# Patient Record
Sex: FEMALE | Race: WHITE | NOT HISPANIC OR LATINO | Employment: UNEMPLOYED | URBAN - NONMETROPOLITAN AREA
[De-identification: names, ages, dates, MRNs, and addresses within clinical notes are randomized per-mention and may not be internally consistent; named-entity substitution may affect disease eponyms.]

---

## 2017-07-03 ENCOUNTER — TRANSCRIBE ORDERS (OUTPATIENT)
Dept: ADMINISTRATIVE | Facility: HOSPITAL | Age: 57
End: 2017-07-03

## 2017-07-03 DIAGNOSIS — Z12.31 VISIT FOR SCREENING MAMMOGRAM: ICD-10-CM

## 2017-07-03 DIAGNOSIS — Z13.820 SCREENING FOR OSTEOPOROSIS: Primary | ICD-10-CM

## 2017-07-21 ENCOUNTER — HOSPITAL ENCOUNTER (OUTPATIENT)
Dept: MAMMOGRAPHY | Facility: HOSPITAL | Age: 57
Discharge: HOME | End: 2017-07-21
Admitting: ADVANCED PRACTICE MIDWIFE

## 2017-07-21 ENCOUNTER — HOSPITAL ENCOUNTER (OUTPATIENT)
Dept: BONE DENSITY | Facility: HOSPITAL | Age: 57
Discharge: HOME | End: 2017-07-21

## 2017-07-21 DIAGNOSIS — Z12.31 VISIT FOR SCREENING MAMMOGRAM: ICD-10-CM

## 2017-07-21 DIAGNOSIS — Z13.820 SCREENING FOR OSTEOPOROSIS: ICD-10-CM

## 2017-07-21 PROCEDURE — G0202 SCR MAMMO BI INCL CAD: HCPCS

## 2017-07-21 PROCEDURE — 77063 BREAST TOMOSYNTHESIS BI: CPT | Performed by: RADIOLOGY

## 2017-07-21 PROCEDURE — 77063 BREAST TOMOSYNTHESIS BI: CPT

## 2017-07-21 PROCEDURE — 77080 DXA BONE DENSITY AXIAL: CPT | Performed by: RADIOLOGY

## 2017-07-21 PROCEDURE — 77067 SCR MAMMO BI INCL CAD: CPT | Performed by: RADIOLOGY

## 2017-07-21 PROCEDURE — 77080 DXA BONE DENSITY AXIAL: CPT

## 2020-03-23 ENCOUNTER — HOSPITAL ENCOUNTER (EMERGENCY)
Facility: HOSPITAL | Age: 60
Discharge: STILL ADMITTED OR ADMINISTRATIVE | End: 2020-03-23
Attending: EMERGENCY MEDICINE

## 2020-03-23 ENCOUNTER — HOSPITAL ENCOUNTER (INPATIENT)
Facility: HOSPITAL | Age: 60
LOS: 4 days | Discharge: HOME | End: 2020-03-27
Attending: PSYCHIATRY & NEUROLOGY | Admitting: PSYCHIATRY & NEUROLOGY

## 2020-03-23 ENCOUNTER — APPOINTMENT (OUTPATIENT)
Dept: GENERAL RADIOLOGY | Facility: HOSPITAL | Age: 60
End: 2020-03-23

## 2020-03-23 VITALS
RESPIRATION RATE: 18 BRPM | WEIGHT: 175 LBS | TEMPERATURE: 97.8 F | HEART RATE: 94 BPM | DIASTOLIC BLOOD PRESSURE: 83 MMHG | SYSTOLIC BLOOD PRESSURE: 129 MMHG | HEIGHT: 66 IN | OXYGEN SATURATION: 99 % | BODY MASS INDEX: 28.12 KG/M2

## 2020-03-23 DIAGNOSIS — R45.851 SUICIDAL IDEATION: Primary | ICD-10-CM

## 2020-03-23 DIAGNOSIS — N39.0 ACUTE UTI: ICD-10-CM

## 2020-03-23 PROBLEM — F32.9 MDD (MAJOR DEPRESSIVE DISORDER): Status: ACTIVE | Noted: 2020-03-23

## 2020-03-23 LAB
6-ACETYL MORPHINE: NEGATIVE
ALBUMIN SERPL-MCNC: 4.21 G/DL (ref 3.5–5.2)
ALBUMIN/GLOB SERPL: 1.3 G/DL
ALP SERPL-CCNC: 110 U/L (ref 39–117)
ALT SERPL W P-5'-P-CCNC: 157 U/L (ref 1–33)
AMPHET+METHAMPHET UR QL: NEGATIVE
ANION GAP SERPL CALCULATED.3IONS-SCNC: 15.4 MMOL/L (ref 5–15)
AST SERPL-CCNC: 85 U/L (ref 1–32)
B PERT DNA SPEC QL NAA+PROBE: NOT DETECTED
BACTERIA UR QL AUTO: ABNORMAL /HPF
BARBITURATES UR QL SCN: NEGATIVE
BASOPHILS # BLD AUTO: 0.05 10*3/MM3 (ref 0–0.2)
BASOPHILS NFR BLD AUTO: 0.7 % (ref 0–1.5)
BENZODIAZ UR QL SCN: NEGATIVE
BILIRUB SERPL-MCNC: 0.4 MG/DL (ref 0.2–1.2)
BILIRUB UR QL STRIP: NEGATIVE
BUN BLD-MCNC: 5 MG/DL (ref 6–20)
BUN/CREAT SERPL: 7 (ref 7–25)
BUPRENORPHINE SERPL-MCNC: NEGATIVE NG/ML
C PNEUM DNA NPH QL NAA+NON-PROBE: NOT DETECTED
CALCIUM SPEC-SCNC: 9.1 MG/DL (ref 8.6–10.5)
CANNABINOIDS SERPL QL: NEGATIVE
CHLORIDE SERPL-SCNC: 102 MMOL/L (ref 98–107)
CLARITY UR: CLEAR
CO2 SERPL-SCNC: 23.6 MMOL/L (ref 22–29)
COCAINE UR QL: NEGATIVE
COLOR UR: YELLOW
CREAT BLD-MCNC: 0.71 MG/DL (ref 0.57–1)
CRP SERPL-MCNC: 0.51 MG/DL (ref 0–0.5)
DEPRECATED RDW RBC AUTO: 47.2 FL (ref 37–54)
EOSINOPHIL # BLD AUTO: 0.19 10*3/MM3 (ref 0–0.4)
EOSINOPHIL NFR BLD AUTO: 2.5 % (ref 0.3–6.2)
ERYTHROCYTE [DISTWIDTH] IN BLOOD BY AUTOMATED COUNT: 13.9 % (ref 12.3–15.4)
ETHANOL BLD-MCNC: <10 MG/DL (ref 0–10)
ETHANOL UR QL: <0.01 %
FLUAV H1 2009 PAND RNA NPH QL NAA+PROBE: NOT DETECTED
FLUAV H1 HA GENE NPH QL NAA+PROBE: NOT DETECTED
FLUAV H3 RNA NPH QL NAA+PROBE: NOT DETECTED
FLUAV SUBTYP SPEC NAA+PROBE: NOT DETECTED
FLUBV RNA ISLT QL NAA+PROBE: NOT DETECTED
GFR SERPL CREATININE-BSD FRML MDRD: 85 ML/MIN/1.73
GLOBULIN UR ELPH-MCNC: 3.2 GM/DL
GLUCOSE BLD-MCNC: 110 MG/DL (ref 65–99)
GLUCOSE UR STRIP-MCNC: NEGATIVE MG/DL
HADV DNA SPEC NAA+PROBE: NOT DETECTED
HCOV 229E RNA SPEC QL NAA+PROBE: NOT DETECTED
HCOV HKU1 RNA SPEC QL NAA+PROBE: NOT DETECTED
HCOV NL63 RNA SPEC QL NAA+PROBE: NOT DETECTED
HCOV OC43 RNA SPEC QL NAA+PROBE: NOT DETECTED
HCT VFR BLD AUTO: 43.3 % (ref 34–46.6)
HGB BLD-MCNC: 13.7 G/DL (ref 12–15.9)
HGB UR QL STRIP.AUTO: ABNORMAL
HMPV RNA NPH QL NAA+NON-PROBE: NOT DETECTED
HPIV1 RNA SPEC QL NAA+PROBE: NOT DETECTED
HPIV2 RNA SPEC QL NAA+PROBE: NOT DETECTED
HPIV3 RNA NPH QL NAA+PROBE: NOT DETECTED
HPIV4 P GENE NPH QL NAA+PROBE: NOT DETECTED
HYALINE CASTS UR QL AUTO: ABNORMAL /LPF
IMM GRANULOCYTES # BLD AUTO: 0.05 10*3/MM3 (ref 0–0.05)
IMM GRANULOCYTES NFR BLD AUTO: 0.7 % (ref 0–0.5)
KETONES UR QL STRIP: NEGATIVE
LDH SERPL-CCNC: 273 U/L (ref 135–214)
LEUKOCYTE ESTERASE UR QL STRIP.AUTO: ABNORMAL
LYMPHOCYTES # BLD AUTO: 1.96 10*3/MM3 (ref 0.7–3.1)
LYMPHOCYTES NFR BLD AUTO: 25.7 % (ref 19.6–45.3)
M PNEUMO IGG SER IA-ACNC: NOT DETECTED
MCH RBC QN AUTO: 29.7 PG (ref 26.6–33)
MCHC RBC AUTO-ENTMCNC: 31.6 G/DL (ref 31.5–35.7)
MCV RBC AUTO: 93.9 FL (ref 79–97)
METHADONE UR QL SCN: NEGATIVE
MONOCYTES # BLD AUTO: 0.75 10*3/MM3 (ref 0.1–0.9)
MONOCYTES NFR BLD AUTO: 9.8 % (ref 5–12)
NEUTROPHILS # BLD AUTO: 4.64 10*3/MM3 (ref 1.7–7)
NEUTROPHILS NFR BLD AUTO: 60.6 % (ref 42.7–76)
NITRITE UR QL STRIP: NEGATIVE
NRBC BLD AUTO-RTO: 0 /100 WBC (ref 0–0.2)
OPIATES UR QL: NEGATIVE
OXYCODONE UR QL SCN: NEGATIVE
PCP UR QL SCN: NEGATIVE
PH UR STRIP.AUTO: <=5 [PH] (ref 5–8)
PLATELET # BLD AUTO: 258 10*3/MM3 (ref 140–450)
PMV BLD AUTO: 9.7 FL (ref 6–12)
POTASSIUM BLD-SCNC: 4.1 MMOL/L (ref 3.5–5.2)
PROT SERPL-MCNC: 7.4 G/DL (ref 6–8.5)
PROT UR QL STRIP: NEGATIVE
RBC # BLD AUTO: 4.61 10*6/MM3 (ref 3.77–5.28)
RBC # UR: ABNORMAL /HPF
REF LAB TEST METHOD: ABNORMAL
RHINOVIRUS RNA SPEC NAA+PROBE: DETECTED
RSV RNA NPH QL NAA+NON-PROBE: NOT DETECTED
S PYO AG THROAT QL: NEGATIVE
SODIUM BLD-SCNC: 141 MMOL/L (ref 136–145)
SP GR UR STRIP: <=1.005 (ref 1–1.03)
SQUAMOUS #/AREA URNS HPF: ABNORMAL /HPF
UROBILINOGEN UR QL STRIP: ABNORMAL
WBC NRBC COR # BLD: 7.64 10*3/MM3 (ref 3.4–10.8)
WBC UR QL AUTO: ABNORMAL /HPF

## 2020-03-23 PROCEDURE — 86140 C-REACTIVE PROTEIN: CPT | Performed by: NURSE PRACTITIONER

## 2020-03-23 PROCEDURE — 80307 DRUG TEST PRSMV CHEM ANLYZR: CPT | Performed by: NURSE PRACTITIONER

## 2020-03-23 PROCEDURE — 81001 URINALYSIS AUTO W/SCOPE: CPT | Performed by: NURSE PRACTITIONER

## 2020-03-23 PROCEDURE — 93005 ELECTROCARDIOGRAM TRACING: CPT | Performed by: PSYCHIATRY & NEUROLOGY

## 2020-03-23 PROCEDURE — 87081 CULTURE SCREEN ONLY: CPT | Performed by: NURSE PRACTITIONER

## 2020-03-23 PROCEDURE — 80053 COMPREHEN METABOLIC PANEL: CPT | Performed by: NURSE PRACTITIONER

## 2020-03-23 PROCEDURE — 85025 COMPLETE CBC W/AUTO DIFF WBC: CPT | Performed by: NURSE PRACTITIONER

## 2020-03-23 PROCEDURE — 87880 STREP A ASSAY W/OPTIC: CPT | Performed by: NURSE PRACTITIONER

## 2020-03-23 PROCEDURE — 71045 X-RAY EXAM CHEST 1 VIEW: CPT | Performed by: RADIOLOGY

## 2020-03-23 PROCEDURE — 71045 X-RAY EXAM CHEST 1 VIEW: CPT

## 2020-03-23 PROCEDURE — 83615 LACTATE (LD) (LDH) ENZYME: CPT | Performed by: NURSE PRACTITIONER

## 2020-03-23 PROCEDURE — 0099U HC BIOFIRE FILMARRAY RESP PANEL 1: CPT | Performed by: NURSE PRACTITIONER

## 2020-03-23 PROCEDURE — 87040 BLOOD CULTURE FOR BACTERIA: CPT | Performed by: NURSE PRACTITIONER

## 2020-03-23 RX ORDER — AMOXICILLIN 500 MG/1
1000 CAPSULE ORAL 2 TIMES DAILY
COMMUNITY
Start: 2020-03-11 | End: 2020-03-27 | Stop reason: HOSPADM

## 2020-03-23 RX ORDER — BENZTROPINE MESYLATE 1 MG/ML
1 INJECTION INTRAMUSCULAR; INTRAVENOUS ONCE AS NEEDED
Status: DISCONTINUED | OUTPATIENT
Start: 2020-03-23 | End: 2020-03-27 | Stop reason: HOSPADM

## 2020-03-23 RX ORDER — CEFDINIR 300 MG/1
300 CAPSULE ORAL EVERY 12 HOURS SCHEDULED
Status: DISCONTINUED | OUTPATIENT
Start: 2020-03-24 | End: 2020-03-23

## 2020-03-23 RX ORDER — BENZONATATE 100 MG/1
100 CAPSULE ORAL 3 TIMES DAILY PRN
Status: DISCONTINUED | OUTPATIENT
Start: 2020-03-23 | End: 2020-03-27 | Stop reason: HOSPADM

## 2020-03-23 RX ORDER — ALUMINA, MAGNESIA, AND SIMETHICONE 2400; 2400; 240 MG/30ML; MG/30ML; MG/30ML
15 SUSPENSION ORAL EVERY 6 HOURS PRN
Status: DISCONTINUED | OUTPATIENT
Start: 2020-03-23 | End: 2020-03-27 | Stop reason: HOSPADM

## 2020-03-23 RX ORDER — ONDANSETRON 4 MG/1
4 TABLET, FILM COATED ORAL EVERY 6 HOURS PRN
Status: DISCONTINUED | OUTPATIENT
Start: 2020-03-23 | End: 2020-03-27 | Stop reason: HOSPADM

## 2020-03-23 RX ORDER — HYDROXYZINE 50 MG/1
50 TABLET, FILM COATED ORAL EVERY 6 HOURS PRN
Status: DISCONTINUED | OUTPATIENT
Start: 2020-03-23 | End: 2020-03-25

## 2020-03-23 RX ORDER — BENZTROPINE MESYLATE 1 MG/1
2 TABLET ORAL ONCE AS NEEDED
Status: DISCONTINUED | OUTPATIENT
Start: 2020-03-23 | End: 2020-03-27 | Stop reason: HOSPADM

## 2020-03-23 RX ORDER — CEFDINIR 300 MG/1
300 CAPSULE ORAL ONCE
Status: COMPLETED | OUTPATIENT
Start: 2020-03-23 | End: 2020-03-23

## 2020-03-23 RX ORDER — CLONIDINE HYDROCHLORIDE 0.1 MG/1
0.1 TABLET ORAL ONCE
Status: COMPLETED | OUTPATIENT
Start: 2020-03-23 | End: 2020-03-23

## 2020-03-23 RX ORDER — FAMOTIDINE 20 MG/1
20 TABLET, FILM COATED ORAL 2 TIMES DAILY PRN
Status: DISCONTINUED | OUTPATIENT
Start: 2020-03-23 | End: 2020-03-27 | Stop reason: HOSPADM

## 2020-03-23 RX ORDER — CEFDINIR 300 MG/1
300 CAPSULE ORAL EVERY 12 HOURS SCHEDULED
Status: DISCONTINUED | OUTPATIENT
Start: 2020-03-24 | End: 2020-03-27 | Stop reason: HOSPADM

## 2020-03-23 RX ORDER — AMOXICILLIN 500 MG/1
1000 CAPSULE ORAL 2 TIMES DAILY
Status: CANCELLED | OUTPATIENT
Start: 2020-03-24 | End: 2020-03-24

## 2020-03-23 RX ORDER — LOPERAMIDE HYDROCHLORIDE 2 MG/1
2 CAPSULE ORAL
Status: DISCONTINUED | OUTPATIENT
Start: 2020-03-23 | End: 2020-03-27 | Stop reason: HOSPADM

## 2020-03-23 RX ORDER — PANTOPRAZOLE SODIUM 40 MG/1
40 TABLET, DELAYED RELEASE ORAL DAILY
COMMUNITY

## 2020-03-23 RX ORDER — IBUPROFEN 400 MG/1
400 TABLET ORAL EVERY 6 HOURS PRN
Status: DISCONTINUED | OUTPATIENT
Start: 2020-03-23 | End: 2020-03-27 | Stop reason: HOSPADM

## 2020-03-23 RX ORDER — NITROFURANTOIN 25; 75 MG/1; MG/1
100 CAPSULE ORAL ONCE
Status: DISCONTINUED | OUTPATIENT
Start: 2020-03-23 | End: 2020-03-23

## 2020-03-23 RX ORDER — ECHINACEA PURPUREA EXTRACT 125 MG
2 TABLET ORAL AS NEEDED
Status: DISCONTINUED | OUTPATIENT
Start: 2020-03-23 | End: 2020-03-27 | Stop reason: HOSPADM

## 2020-03-23 RX ORDER — TRAZODONE HYDROCHLORIDE 50 MG/1
50 TABLET ORAL NIGHTLY PRN
Status: DISCONTINUED | OUTPATIENT
Start: 2020-03-23 | End: 2020-03-27 | Stop reason: HOSPADM

## 2020-03-23 RX ADMIN — CEFDINIR 300 MG: 300 CAPSULE ORAL at 20:30

## 2020-03-23 RX ADMIN — CLONIDINE HYDROCHLORIDE 0.1 MG: 0.1 TABLET ORAL at 20:30

## 2020-03-23 NOTE — ED PROVIDER NOTES
Subjective     History provided by:  Patient   used: No    URI   Presenting symptoms: congestion, cough and fever    Severity:  Moderate  Onset quality:  Sudden  Timing:  Constant  Progression:  Waxing and waning  Chronicity:  Recurrent  Relieved by:  Nothing  Worsened by:  Nothing  Ineffective treatments:  Prescription medications  Associated symptoms: wheezing    Associated symptoms: no arthralgias    Risk factors: not elderly    Risk factors comment:  Fredy is homeless and lives at shelter. Reports she was kicked out of shelter      Review of Systems   Constitutional: Positive for fever.   HENT: Positive for congestion.    Eyes: Negative.    Respiratory: Positive for cough and wheezing.    Cardiovascular: Negative.    Gastrointestinal: Negative.    Endocrine: Negative.    Genitourinary: Negative.    Musculoskeletal: Negative.  Negative for arthralgias.   Skin: Negative.    Allergic/Immunologic: Negative.    Neurological: Negative.    Hematological: Negative.    Psychiatric/Behavioral: Positive for dysphoric mood and suicidal ideas.       Past Medical History:   Diagnosis Date   • Anxiety    • Depression        No Known Allergies    Past Surgical History:   Procedure Laterality Date   • TONSILLECTOMY         Family History   Problem Relation Age of Onset   • Depression Sister        Social History     Socioeconomic History   • Marital status: Single     Spouse name: Not on file   • Number of children: Not on file   • Years of education: Not on file   • Highest education level: Not on file   Tobacco Use   • Smoking status: Former Smoker   • Smokeless tobacco: Never Used   Substance and Sexual Activity   • Alcohol use: Not Currently   • Drug use: Not Currently     Types: Benzodiazepines, Marijuana   • Sexual activity: Not Currently     Partners: Male           Objective   Physical Exam   Constitutional: She is oriented to person, place, and time. She appears well-developed and well-nourished.    HENT:   Head: Normocephalic.   Right Ear: External ear normal.   Left Ear: External ear normal.   Mouth/Throat: Oropharynx is clear and moist.   Eyes: Pupils are equal, round, and reactive to light. Conjunctivae and EOM are normal.   Neck: Normal range of motion. Neck supple.   Cardiovascular: Normal rate, regular rhythm, normal heart sounds and intact distal pulses.   Pulmonary/Chest: Effort normal and breath sounds normal.   Abdominal: Soft. Bowel sounds are normal.   Musculoskeletal: Normal range of motion.   Neurological: She is alert and oriented to person, place, and time.   Skin: Skin is warm and dry. Capillary refill takes less than 2 seconds.   Psychiatric: Her behavior is normal. She exhibits a depressed mood. She expresses suicidal ideation. She expresses suicidal plans.   Nursing note and vitals reviewed.      Procedures           ED Course  ED Course as of Mar 23 2248   Mon Mar 23, 2020   2019 Patient is medically clear and stable for psych eval. Patient has UTI and recommend Cefdinir 300 mg PO BID x 7 days for UTI    [TAD]   2145 IMPRESSION:  No radiographic evidence of acute cardiac or pulmonary  disease.   XR Chest AP [ES]      ED Course User Index  [ES] Javier Ugarte MD  [TAD] Matt Gale, APRN                                           [facility]    Final diagnoses:   Suicidal ideation   Acute UTI            Javier Ugarte MD  03/23/20 2247       Javier Ugarte MD  03/23/20 2248

## 2020-03-24 LAB
HAV IGM SERPL QL IA: NORMAL
HBV CORE IGM SERPL QL IA: NORMAL
HBV SURFACE AG SERPL QL IA: NORMAL
HCV AB SER DONR QL: NORMAL
HIV1+2 AB SER QL: NORMAL
HOLD SPECIMEN: NORMAL

## 2020-03-24 PROCEDURE — 93010 ELECTROCARDIOGRAM REPORT: CPT | Performed by: INTERNAL MEDICINE

## 2020-03-24 PROCEDURE — G0432 EIA HIV-1/HIV-2 SCREEN: HCPCS | Performed by: PSYCHIATRY & NEUROLOGY

## 2020-03-24 PROCEDURE — 80074 ACUTE HEPATITIS PANEL: CPT | Performed by: PSYCHIATRY & NEUROLOGY

## 2020-03-24 RX ORDER — PANTOPRAZOLE SODIUM 40 MG/1
40 TABLET, DELAYED RELEASE ORAL DAILY
Status: DISCONTINUED | OUTPATIENT
Start: 2020-03-24 | End: 2020-03-27 | Stop reason: HOSPADM

## 2020-03-24 RX ORDER — BUSPIRONE HYDROCHLORIDE 10 MG/1
5 TABLET ORAL EVERY 8 HOURS SCHEDULED
Status: DISCONTINUED | OUTPATIENT
Start: 2020-03-24 | End: 2020-03-27 | Stop reason: HOSPADM

## 2020-03-24 RX ADMIN — BUSPIRONE HYDROCHLORIDE 5 MG: 10 TABLET ORAL at 13:09

## 2020-03-24 RX ADMIN — CEFDINIR 300 MG: 300 CAPSULE ORAL at 21:12

## 2020-03-24 RX ADMIN — HYDROXYZINE HYDROCHLORIDE 50 MG: 50 TABLET ORAL at 15:45

## 2020-03-24 RX ADMIN — BUSPIRONE HYDROCHLORIDE 5 MG: 10 TABLET ORAL at 21:12

## 2020-03-24 RX ADMIN — SERTRALINE 50 MG: 50 TABLET, FILM COATED ORAL at 13:09

## 2020-03-24 RX ADMIN — HYDROXYZINE HYDROCHLORIDE 50 MG: 50 TABLET ORAL at 08:07

## 2020-03-24 RX ADMIN — CEFDINIR 300 MG: 300 CAPSULE ORAL at 08:07

## 2020-03-24 RX ADMIN — PANTOPRAZOLE SODIUM 40 MG: 40 TABLET, DELAYED RELEASE ORAL at 08:07

## 2020-03-24 NOTE — PLAN OF CARE
Problem: Patient Care Overview  Goal: Plan of Care Review  Outcome: Ongoing (interventions implemented as appropriate)  Flowsheets (Taken 3/24/2020 0233)  Progress: no change  Plan of Care Reviewed With: patient  Patient Agreement with Plan of Care: agrees  Outcome Summary: New admit; see previous nursing notes for admission details. Patient calm and cooperative since arrival to unit.

## 2020-03-24 NOTE — NURSING NOTE
Spoke to lab at this time, states the pt's respiratory panel results should be available in approx 10 minutes.

## 2020-03-24 NOTE — NURSING NOTE
Pt states she began having suicidal thoughts last week, states she is not in a good situation right now.  Pt states she was in [facility], states she left today, states she has been sick since January, states others there were diagnosed with RSV so she just left today.      Pt states has a plan of either overdosing on medication or using a gun to kill herself, pt states she does not have access to a gun and has not attempted suicide in the past.  Pt denies any HI or AVH, pt also denies any alcohol or substance abuse.    Pt rates depression 10/10 and anxiety 10/10 at this time.

## 2020-03-24 NOTE — PLAN OF CARE
Problem: Patient Care Overview  Goal: Plan of Care Review  Flowsheets (Taken 3/24/2020 0710 by Barbara Garnett RN)  Plan of Care Reviewed With: patient  Patient Agreement with Plan of Care: agrees     Problem: Patient Care Overview  Goal: Individualization and Mutuality  Flowsheets  Taken 3/24/2020 1423 by Greta Tinajero  Patient Personal Strengths: expressive of needs;expressive of emotions;motivated for treatment;motivated for recovery  Patient Vulnerabilities: Ineffective coping skills; depression; lack of resources; homeless  Taken 3/24/2020 1432 by Greta Tinajero  Patient Specific Goals (Include Timeframe): Patient will deny SI at discharge. Patient will identify 1-2 healthy coping skills prior to discharge.  Patient Specific Interventions: Patient will be given daily Psychiatric evaluation, 20 minutes each day; Patient will be offered 1-4 individual sessions 20-30 minutes each day and daily groups. Will utilize CBT and brief therapy modalities; Will assist with aftercare.  Taken 3/23/2020 4436 by Deidre George RN  What Information Would Help Us Give You More Personalized Care?: None verbalized     Problem: Patient Care Overview  Goal: Discharge Needs Assessment  Flowsheets  Taken 3/24/2020 1432 by Greta Tinajero  Outpatient/Agency/Support Group Needs: outpatient medication management;outpatient counseling  Discharge Coordination/Progress: Patient has [employer] insurance and should be able to afford medications at discharge.  Current Discharge Risk: psychiatric illness;homeless;financial support inadequate;lack of support system/caregiver  Concerns to be Addressed: suicidal;mental health;coping/stress;financial/insurance;homelessness  Readmission Within the Last 30 Days: no previous admission in last 30 days  Patient/Family Anticipated Services at Transition: mental health services  Patient's Choice of Community Agency(s): Unsure at this time as patient is not sure where she will be  staying  Patient/Family Anticipates Transition to: shelter  Taken 3/23/2020 2326 by Deidre George RN  Transportation Anticipated: car, drives self     Problem: Patient Care Overview  Goal: Interprofessional Rounds/Family Conf  Flowsheets (Taken 3/24/2020 1432)  Participants: family; nursing; psychiatrist; social work; milieu/psych techs  Summary: Therapist will communicate with Nursing; Psychiatry and Mental health Techs for ongoing collateral and treatment planning. Will encourage family involvement.     Problem: Overarching Goals (Adult)  Goal: Optimized Coping Skills in Response to Life Stressors  Intervention: Promote Effective Coping Strategies  Note:   Unable to identify any healthy coping skills at this time. Patient is on precautions.     Problem: Overarching Goals (Adult)  Goal: Develops/Participates in Therapeutic [place] to Support Successful Transition  Intervention: Foster Therapeutic [place]  Flowsheets (Taken 3/24/2020 1432)  Trust Relationship/Rapport: care explained     Problem: Overarching Goals (Adult)  Goal: Develops/Participates in Therapeutic [place] to Support Successful Transition  Intervention: Mutually Develop Transition Plan  Note:   Unable to discuss aftercare at this time due to patient being under special precautions.    DATA:         Therapist is unable to meet one on one with the patient due to her being on special precautions for fever and cough. Most of the information was taken from the medical record.     Clinical Maneuvering/Intervention:     Therapist completed integrated summary, treatment plan, and initiated social history this date.  Therapist is strongly encouraging family involvement in treatment.       ASSESSMENT:      Ms. Tigist Bates is a 59 year old  female who is single; no children; high school diploma and unemployed. Patient has been homeless for months after loosing her apartment for not paying rent. Patient presents to the ER with suicidal  thoughts with a plan to overdose or shoot herself. She states that she does not have access to a firearm. Patient reports that she has been feeling helpless; hopeless; worthless; tearful and fatigue. Patient reports also having flu like symptoms for 3 months. She has a fever and a cough. She is currently staying in her room due to precautionary measures.  Patient has not had any previous inpatient hospitalizations. She has took Elavil in the past. Patient is unsure about discharge plans at this point due to her illness and being homeless. Therapist will encourage family contact.     PLAN:       Patient to remain hospitalized this date.     Treatment team will focus efforts on stabilizing patient's acute symptoms while providing education on healthy coping and crisis management to reduce hospitalizations.   Patient requires daily psychiatrist evaluation and 24/7 nursing supervision to promote patient  safety.     Therapist will offer 1-4 individual sessions, 1 therapy group daily, family education, and appropriate referral.    Therapist recommends medication management and talk therapy. Will assist patient in finding housing upon discharge. This could be an issue due to patient's current medical condition.

## 2020-03-24 NOTE — NURSING NOTE
Dr. Cloud called back, new orders to admit the patient to A&E with routine orders SP3, private room, and keep a surgical mask on the patient at all times.

## 2020-03-24 NOTE — NURSING NOTE
Patient arrived to unit and vital signs were obtained. Temp 99.5, /85, Resp 18, HR 90, O2 sat 96%. On-call MD, VEENA Cloud, notified in regards to the temperature rapidly jumping from the recorded temperature of 97.8 in the intake area to 99.5 when patient arrived to unit. On-call MD recommended that no medication be given to patient for temperature at this time and to keep checking patient's temperature throughout the night. On-call MD also recommended that patient be educated on the importance of wearing a mask at all times even while in room.

## 2020-03-24 NOTE — H&P
"Patient Care Team:  Sher Blackburn DO as PCP - General (Family Medicine)    Chief Complaint: \"I have had flu symptoms since mid January, have been on antibiotics twice, was prescribed penicillin for 2 weeks, then amoxicillin for 2 weeks, took last dose yesterday.. I asked her if she has any nervous complaints, she says she is very nervous, she also has sadness, she cries.  She also said that she had suicidal thoughts last week, but denied that she had a plan.  She denied that she has suicidal thoughts today.    Subjective       History of Present Illness: Patient is a 59-year-old single female never , has no children, was living alone, lost her Apt. 3 weeks ago, has been in the homeless shelter since then, but does not intend to return back there.  She was admitted on 3/23/2020 after she presented to the hospital reporting congestion cough and fever, also reported that she was homeless and was kicked out of the shelter.  She was medically cleared and admitted to  psychiatry because she reported suicidal thoughts since last week and also reported plan to overdose or using a gun she did say she did not have access to the gun and denied any previous suicidal attempts     I did the examination on the phone patient sitting in the room next to me  she reported complaints as described above.. She did not confirm any stressors other than currently being homeless.  I have talked with Yane Cooper on phone, In presence of Minerva Salguero. She has told me that she has confirmed that patient does not need Covid-19 testing.   Substance Abuse History: She denied any    Legal History: She denied any    Past Psychiatric History: Patient denied any but did state that she has been on Elavil long time ago from her family physician, and could not confirm the dates or the dose  She denies any history of psychiatric hospitalizations and denied any previous suicide attempts    History Dr. Blacbkurn is her family physician.  She " last saw him 2 weeks ago for respiratory complaints and was prescribed amoxicillin for 2 weeks which she completed yesterday.  She has had tonsillectomy.  She denies any known drug allergies.  She is currently also on Protonix 40 mg daily  Past Medical History:   Diagnosis Date   • Anxiety    • Chronic pain    • Depression    • GERD (gastroesophageal reflux disease)      Past Surgical History:   Procedure Laterality Date   • TONSILLECTOMY       Family History   Problem Relation Age of Onset   • Depression Sister      Social History     Tobacco Use   • Smoking status: Former Smoker   • Smokeless tobacco: Never Used   Substance Use Topics   • Alcohol use: Not Currently   • Drug use: Not Currently     Types: Benzodiazepines, Marijuana     Medications Prior to Admission   Medication Sig Dispense Refill Last Dose   • amoxicillin (AMOXIL) 500 MG capsule Take 1,000 mg by mouth 2 (Two) Times a Day.   3/23/2020 at AM   • pantoprazole (PROTONIX) 40 MG EC tablet Take 40 mg by mouth Daily.   3/23/2020 at AM     Allergies:  Patient has no known allergies.  Family history patient says her sister sees a psychiatrist for depression  Personal history she has an associate degree in college.  She last worked in 2015 when she was working for [employer]  Review of Systems:     Constitution: No complaints today  Eyes: Negative  ENT: Negative  Respiratory: Negative  Cardiovascular: Negative  Gastrointestinal: Negative  Genitourinary negative:   Musculoskeletal: Negative  Neurological: Negative    Mental Status Exam:    Hygiene:   fair  Cooperation:  Cooperative  Eye Contact:  Fair  Psychomotor Behavior:  Appropriate  Affect:  Appropriate  Speech:  Normal  Thought Progress:  Goal directed  Thought Content:  Mood congruent  Suicidal:  None today  Homicidal:  None  Hallucinations:  None  Delusion:  None  Memory:  Intact  Orientation:  Person, Place and Time  Reliability:  fair  Insight:  Fair  Judgement:  Fair and  Impaired      Physical Exam:      General Appearance:    Alert, cooperative, in no acute distress   Head:    Normocephalic, without obvious abnormality, atraumatic   Eyes:            Lids and lashes normal, conjunctivae and sclerae normal, no   icterus, no pallor, corneas clear, PERRLA   Ears:    Ears appear intact with no abnormalities noted   Throat:   No oral lesions, no thrush, oral mucosa moist   Neck:   No adenopathy, supple, trachea midline, no thyromegaly, no     carotid bruit, no JVD   Back:     No kyphosis present, no scoliosis present, no skin lesions,       erythema or scars, no tenderness to percussion or                   palpation,   range of motion normal   Lungs:     Clear to auscultation,respirations regular, even and                   unlabored    Heart:    Regular rhythm and normal rate, normal S1 and S2, no            murmur, no gallop, no rub, no click   Breast Exam:    Deferred   Abdomen:     Normal bowel sounds, no masses, no organomegaly, soft        non-tender, non-distended, no guarding, no rebound                 tenderness   Genitalia:    Deferred   Extremities:   Moves all extremities well, no edema, no cyanosis, no              redness   Pulses:   Pulses palpable and equal bilaterally   Skin:   No bleeding, bruising or rash   Lymph nodes:   No palpable adenopathy   Neurologic:   Cranial nerves 2 - 12 grossly intact, sensation intact, DTR        present and equal bilaterally       Objective     Vital Signs    Temp:  [97.8 °F (36.6 °C)-99.5 °F (37.5 °C)] 98.6 °F (37 °C)  Heart Rate:  [82-94] 82  Resp:  [18] 18  BP: (120-172)/() 120/79    Lab Results:   Lab Results (last 24 hours)     Procedure Component Value Units Date/Time    HIV-1 / O / 2 Ag / Antibody 4th Generation [[id number]]  (Normal) Collected:  03/24/20 0533    Specimen:  Blood Updated:  03/24/20 0621     HIV-1/ HIV-2 Non-Reactive     Comment: A non-reactive test result does not preclude the possibility of exposure to HIV  or infection with HIV. An antibody response to recent exposure may take several months to reach detectable levels.       Narrative:       The HIV antibody/antigen combo assay is a qualitative assay for HIV that includes the p24 antigen as well as antibodies to HIV types 1 and 2. This test is intended to be used as a screening assay in the diagnosis of HIV infection in patients over the age of 2.  Results may be falsely decreased if patient taking Biotin.      Hepatitis Panel, Acute [[id number]] Collected:  03/24/20 0533    Specimen:  Blood Updated:  03/24/20 0616    Narrative:       The following orders were created for panel order Hepatitis Panel, Acute.  Procedure                               Abnormality         Status                     ---------                               -----------         ------                     Hepatitis Panel, Acute[[id number]]       Normal              Final result               Hep B Confirmation Tube[[id number]]                          In process                   Please view results for these tests on the individual orders.    Hepatitis Panel, Acute [[id number]]  (Normal) Collected:  03/24/20 0533    Specimen:  Blood Updated:  03/24/20 0616     Hepatitis B Surface Ag Non-Reactive     Hep A IgM Non-Reactive     Hep B C IgM Non-Reactive     Hepatitis C Ab Non-Reactive    Narrative:       Results may be falsely decreased if patient taking Biotin.     Hep B Confirmation Tube [[id number]] Collected:  03/24/20 0533    Specimen:  Blood Updated:  03/24/20 0542           Labs:     Lab Results (last 24 hours)     Procedure Component Value Units Date/Time    HIV-1 / O / 2 Ag / Antibody 4th Generation [[id number]]  (Normal) Collected:  03/24/20 0533    Specimen:  Blood Updated:  03/24/20 0621     HIV-1/ HIV-2 Non-Reactive     Comment: A non-reactive test result does not preclude the possibility of exposure to HIV or infection with HIV. An antibody response to recent exposure may take several  months to reach detectable levels.       Narrative:       The HIV antibody/antigen combo assay is a qualitative assay for HIV that includes the p24 antigen as well as antibodies to HIV types 1 and 2. This test is intended to be used as a screening assay in the diagnosis of HIV infection in patients over the age of 2.  Results may be falsely decreased if patient taking Biotin.      Hepatitis Panel, Acute [[id number]] Collected:  03/24/20 0533    Specimen:  Blood Updated:  03/24/20 0616    Narrative:       The following orders were created for panel order Hepatitis Panel, Acute.  Procedure                               Abnormality         Status                     ---------                               -----------         ------                     Hepatitis Panel, Acute[[id number]]       Normal              Final result               Hep B Confirmation Tube[[id number]]                          In process                   Please view results for these tests on the individual orders.    Hepatitis Panel, Acute [[id number]]  (Normal) Collected:  03/24/20 0533    Specimen:  Blood Updated:  03/24/20 0616     Hepatitis B Surface Ag Non-Reactive     Hep A IgM Non-Reactive     Hep B C IgM Non-Reactive     Hepatitis C Ab Non-Reactive    Narrative:       Results may be falsely decreased if patient taking Biotin.     Hep B Confirmation Tube [[id number]] Collected:  03/24/20 0533    Specimen:  Blood Updated:  03/24/20 0542                          Lab Results   Component Value Date    WBC 7.64 03/23/2020    HGB 13.7 03/23/2020    HCT 43.3 03/23/2020    MCV 93.9 03/23/2020     03/23/2020     Lab Results   Component Value Date    GLUCOSE 110 (H) 03/23/2020    BUN 5 (L) 03/23/2020    CREATININE 0.71 03/23/2020    EGFRIFNONA 85 03/23/2020    BCR 7.0 03/23/2020    CO2 23.6 03/23/2020    CALCIUM 9.1 03/23/2020    ALBUMIN 4.21 03/23/2020    AST 85 (H) 03/23/2020     (H) 03/23/2020     Pain Management Panel     Pain  Management Panel Latest Ref Rng & Units 3/23/2020    AMPHETAMINES SCREEN, URINE Negative Negative    BARBITURATES SCREEN Negative Negative    BENZODIAZEPINE SCREEN, URINE Negative Negative    BUPRENORPHINEUR Negative Negative    COCAINE SCREEN, URINE Negative Negative    METHADONE SCREEN, URINE Negative Negative          Assessment/Diagnosis: Depression NOS rule out major depression single episode without,psychosis  Zoloft 50 mg daily    Anxiety NOS  BuSpar 5 mg 3 times daily    UTI  Plancefdinir 300 mg twice daily for 7 days    Results Review    Assessment/Plan       MDD (major depressive disorder)    Treatment Plan discussed with: Patient  I have reviewed and approved the behavioral health treatment plans and problem list. Yes    Onelia Valladares MD  03/24/20  12:10

## 2020-03-24 NOTE — PLAN OF CARE
Problem: Patient Care Overview  Goal: Plan of Care Review  Outcome: Ongoing (interventions implemented as appropriate)  Flowsheets (Taken 3/24/2020 1602)  Progress: improving  Plan of Care Reviewed With: patient  Patient Agreement with Plan of Care: agrees  Note:   Patient has been noted withdrawn, apprehensive but cooperative. She has received prn , Vistaril for anxiety. Patient reports she's anxious,  unsure about contacting her Sister, stating she thinks it would make her feel better to call tonight, Reassured, encouraged Patient. Patient denies alert, oriented x 4, denies hallucination, no delusional content noted. Patient denies suicidal or homicidal ideation.

## 2020-03-24 NOTE — NURSING NOTE
I did speak to Dr. Cloud, discussed assessment and labs, as well as the respiratory panel and the pt complaints upon arrival, Dr. Cloud has asked that Dr. Ugarte call him on his cell phone to discuss the medical complications of the patient.  I have given Dr. Ugarte his number and they will give me an update after they speak.

## 2020-03-24 NOTE — NURSING NOTE
Pt arrived in intake, already searched per ED staff, pt's belongings placed in safe storage, safety precautions in place. Report received from Martha GARDNER.

## 2020-03-25 LAB — BACTERIA SPEC AEROBE CULT: NORMAL

## 2020-03-25 RX ORDER — HYDROXYZINE 50 MG/1
25 TABLET, FILM COATED ORAL EVERY 6 HOURS PRN
Status: DISCONTINUED | OUTPATIENT
Start: 2020-03-25 | End: 2020-03-27 | Stop reason: HOSPADM

## 2020-03-25 RX ADMIN — BUSPIRONE HYDROCHLORIDE 5 MG: 10 TABLET ORAL at 21:02

## 2020-03-25 RX ADMIN — CEFDINIR 300 MG: 300 CAPSULE ORAL at 08:28

## 2020-03-25 RX ADMIN — BUSPIRONE HYDROCHLORIDE 5 MG: 10 TABLET ORAL at 05:23

## 2020-03-25 RX ADMIN — PANTOPRAZOLE SODIUM 40 MG: 40 TABLET, DELAYED RELEASE ORAL at 08:28

## 2020-03-25 RX ADMIN — SERTRALINE 50 MG: 50 TABLET, FILM COATED ORAL at 08:28

## 2020-03-25 RX ADMIN — CEFDINIR 300 MG: 300 CAPSULE ORAL at 21:02

## 2020-03-25 RX ADMIN — BUSPIRONE HYDROCHLORIDE 5 MG: 10 TABLET ORAL at 13:48

## 2020-03-25 RX ADMIN — TRAZODONE HYDROCHLORIDE 50 MG: 50 TABLET ORAL at 21:02

## 2020-03-25 NOTE — NURSING NOTE
Educated patient on the importance of proper handwashing, covering mouth and nose when coughing or sneezing, social distancing, and wearing mask at all times while out of room. Patient verbalizes understanding of teaching.

## 2020-03-25 NOTE — PLAN OF CARE
Problem: Patient Care Overview  Goal: Plan of Care Review  Outcome: Ongoing (interventions implemented as appropriate)  Flowsheets  Taken 3/25/2020 0207 by Susana Watts RN  Progress: no change  Outcome Summary: Patient calm and cooperative. Patient isolated self in room this shift. Rates anxiety a 3 and depression a 5. Denies SI/HI or AVH.  Taken 3/25/2020 0710 by Alberto Montenegro, RN  Plan of Care Reviewed With: patient  Patient Agreement with Plan of Care: agrees

## 2020-03-25 NOTE — PROGRESS NOTES
"   LOS: 2 days   Patient Care Team:  Sher Blackburn DO as PCP - General (Family Medicine)    Chief Complaint: Patient says \"I am okay but I am worried about where I will go, she says someone here and has talked with her sister last night and she did not agree to let her come home with her.  She she rates her depression at about 6, describes symptoms as \"crying and worrying about where I will go\" she rates her anxiety at 5, 5 symptoms as \"not ready to drive yet\".  She says Vistaril helped but it was too strong, she wants a\" nerve pill \"she says she has slept fair about 4 to 5 hours.  She denied SI HI hallucinations or paranoia.  She admits that she is feeling hopeless, rates at 3.  She is oriented x3      Interval History: Patient says she thinks that she can return to [facility], she says she had left on her own from the shelter.  She also says that she has rhinovirus as told by her physicians and in the emergency room, .  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  Procedure                               Abnormality         Status                     ---------                               -----------         ------                     Hepatitis Panel, Acute[[id number]]       Normal              Final result               Hep B Confirmation Tube[[id number]]                          Final result                 Please view results for these tests on the individual orders.    Hep B Confirmation Tube " [[id number]] Collected:  03/24/20 0533    Specimen:  Blood Updated:  03/24/20 1745     Extra Tube Hold for add-ons.     Comment: Auto resulted.              Labs:     Lab Results (last 24 hours)     Procedure Component Value Units Date/Time    Hepatitis Panel, Acute [[id number]] Collected:  03/24/20 0533    Specimen:  Blood Updated:  03/24/20 1745    Narrative:       The following orders were created for panel order Hepatitis Panel, Acute.  Procedure                               Abnormality         Status                     ---------                               -----------         ------                     Hepatitis Panel, Acute[[id number]]       Normal              Final result               Hep B Confirmation Tube[[id number]]                          Final result                 Please view results for these tests on the individual orders.    Hep B Confirmation Tube [[id number]] Collected:  03/24/20 0533    Specimen:  Blood Updated:  03/24/20 1745     Extra Tube Hold for add-ons.     Comment: Auto resulted.                           Exam:    Mental Status Exam:     Hygiene:   fair  Cooperation:  Cooperative  Eye Contact:  Good  Psychomotor Behavior:  Appropriate  Affect:  Appropriate  Speech:  Normal  Thought Progress:  Goal directed  Thought Content:  Mood congruent  Suicidal:  None  Homicidal:  None  Hallucinations:  None  Delusion:  None  Memory:  Intact  Orientation:  Person, Place, Time and Situation  Reliability:  fair  Insight:  Fair  Judgement:  Fair  Impulse Control:  Fair      Results Review:    Lab Results (last 24 hours)     Procedure Component Value Units Date/Time    Hepatitis Panel, Acute [[id number]] Collected:  03/24/20 0533    Specimen:  Blood Updated:  03/24/20 1745    Narrative:       The following orders were created for panel order Hepatitis Panel, Acute.  Procedure                               Abnormality         Status                     ---------                               -----------          ------                     Hepatitis Panel, Acute[[id number]]       Normal              Final result               Hep B Confirmation Tube[[id number]]                          Final result                 Please view results for these tests on the individual orders.    Hep B Confirmation Tube [[id number]] Collected:  03/24/20 0533    Specimen:  Blood Updated:  03/24/20 1745     Extra Tube Hold for add-ons.     Comment: Auto resulted.             Medication Review:    Current Facility-Administered Medications:   •  aluminum-magnesium hydroxide-simethicone (MAALOX MAX) 400-400-40 MG/5ML suspension 15 mL, 15 mL, Oral, Q6H PRN, Gab Cloud MD  •  benzonatate (TESSALON) capsule 100 mg, 100 mg, Oral, TID PRN, Gab Cloud MD  •  benztropine (COGENTIN) tablet 2 mg, 2 mg, Oral, Once PRN **OR** benztropine (COGENTIN) injection 1 mg, 1 mg, Intramuscular, Once PRN, Gab Cloud MD  •  busPIRone (BUSPAR) tablet 5 mg, 5 mg, Oral, Q8H, Onelia Valladares MD, 5 mg at 03/25/20 0523  •  cefdinir (OMNICEF) capsule 300 mg, 300 mg, Oral, Q12H, Gab Cloud MD, 300 mg at 03/25/20 0828  •  famotidine (PEPCID) tablet 20 mg, 20 mg, Oral, BID PRN, Gab Cloud MD  •  hydrOXYzine (ATARAX) tablet 50 mg, 50 mg, Oral, Q6H PRN, Gab Cloud MD, 50 mg at 03/24/20 1545  •  ibuprofen (ADVIL,MOTRIN) tablet 400 mg, 400 mg, Oral, Q6H PRN, Gab Cloud MD  •  loperamide (IMODIUM) capsule 2 mg, 2 mg, Oral, Q2H PRN, Gab Cloud MD  •  magnesium hydroxide (MILK OF MAGNESIA) suspension 2400 mg/10mL 10 mL, 10 mL, Oral, Daily PRN, Gab Cloud MD  •  ondansetron (ZOFRAN) tablet 4 mg, 4 mg, Oral, Q6H PRN, Gab Cloud MD  •  pantoprazole (PROTONIX) EC tablet 40 mg, 40 mg, Oral, Daily, Gab Cloud MD, 40 mg at 03/25/20 0828  •  sertraline (ZOLOFT) tablet 50 mg, 50 mg, Oral, Daily, Onelia Valladares MD, 50 mg at 03/25/20 0828  •  sodium chloride nasal spray 2 spray, 2 spray, Each Nare, PRN,  Gab Cloud MD  •  traZODone (DESYREL) tablet 50 mg, 50 mg, Oral, Nightly PRN, Gab Cloud MD     Special Precautions Level: III        Assessment/Plan     Assessment: Assessment/Diagnosis: Depression NOS rule out major depression single episode without,psychosis  Zoloft 50 mg daily     Anxiety NOS  BuSpar 5 mg 3 times daily  Decrease Vistaril to 25 mg every 6 hours as needed  UTI  Plancefdinir 300 mg twice daily for 7 days      Treatment Plan: Informed therapist about patient intending to return back to,[facility].  When patient also said she does not remember telling me that her sister has not agreed to take her with her .  Per therapist she has not talked to her sister at all.  Patient says she still intends to return to [facility], but would like her sister to know where she is at.  Therapist will assist with referral to [facility].      Treatment Plan discussed with: Patient and therapist    I have reviewed and approved the behavioral health treatment plans and problem list. Yes      Onelia Valladares MD  03/25/20  11:05

## 2020-03-25 NOTE — PLAN OF CARE
Problem: Patient Care Overview  Goal: Plan of Care Review  Outcome: Ongoing (interventions implemented as appropriate)  Flowsheets (Taken 3/25/2020 0207)  Progress: no change  Plan of Care Reviewed With: patient  Patient Agreement with Plan of Care: agrees  Outcome Summary: Patient calm and cooperative. Patient isolated self in room this shift. Rates anxiety a 3 and depression a 5. Denies SI/HI or AVH.

## 2020-03-25 NOTE — PLAN OF CARE
"  Problem: Overarching Goals (Adult)  Goal: Optimized Coping Skills in Response to Life Stressors  Intervention: Promote Effective Coping Strategies  Flowsheets (Taken 3/25/2020 0710 by Alberto Montenegro, RN)  Supportive Measures: active listening utilized;positive reinforcement provided;verbalization of feelings encouraged  Note:   Patient states that she enjoys listening to music for healthy coping skills. We also talked about her getting exercise and taking walks     Problem: Overarching Goals (Adult)  Goal: Develops/Participates in Therapeutic Bethany to Support Successful Transition  Intervention: Foster Therapeutic Bethany  Flowsheets (Taken 3/25/2020 0710 by Alberto Montenegro, RN)  Trust Relationship/Rapport: care explained;emotional support provided;empathic listening provided;questions answered;questions encouraged;reassurance provided;thoughts/feelings acknowledged     Problem: Overarching Goals (Adult)  Goal: Develops/Participates in Therapeutic Bethany to Support Successful Transition  Intervention: Mutually Develop Transition Plan  Flowsheets (Taken 3/25/2020 1051)  Transition Support: community resources reviewed; follow-up care discussed  Note:   Patient will follow up at [facility] at discharge. Also is agreeable for therapist to contact her family to see if she can come there upon discharge from the hospital. Patient was \"kicked out\" of the shelter due to having the rhinovirus and making others sick.    Patient identifies her depression at a 8-9 and her anxiety at a 6-7. No suicidal thoughts today.    Initially patient told Dr Valladares that she was told by staff here that she could go to her sisters. Together, Dr Valladares and I talked with the patient and she is now saying she doesn't remember saying that. Patient states that she will call her sister first to see if she can go there. States that if she can't go to her sister's house then she will call [facility]. States that she left there on her own. She has all of " her belongings in her car. States that she does not feel safe driving today.    Therapist called [facility] and was given verbal consent by patient to do so. She wanted me to call and see if they will take her back. I attempted to call twice and no one in the shelter is answering the phone. Navigator will continue to attempt contact with them for possible admission.    Therapist attempted to call the patient's sister to see if patient can go there. I was not able to reach her and left a message for a return call. Patient is also encouraged to attempt contact with her sister and [facility].    Patient was agreeable for a [facility] referral. Navigator will assist with sending referral and checking on availability.    Patient is back and forth on what she wants to do. She currently is on precautions for the rhino virus so a mask and gloves are required. Patient's uncertainty about what she wants to do is making the disposition process more difficult.  Patient has signed a consent for [facility].

## 2020-03-25 NOTE — PROGRESS NOTES
Navigator is helping Primary Therapist with discharge planning for patient. Navigator completed the following referrals on this day:    Liz Norris - [phone]  -Sent 3/25    Attempted to contact [facility]. No answer at this time.

## 2020-03-26 PROCEDURE — 63710000001 ONDANSETRON PER 8 MG: Performed by: PSYCHIATRY & NEUROLOGY

## 2020-03-26 RX ADMIN — CEFDINIR 300 MG: 300 CAPSULE ORAL at 20:44

## 2020-03-26 RX ADMIN — CEFDINIR 300 MG: 300 CAPSULE ORAL at 08:28

## 2020-03-26 RX ADMIN — ONDANSETRON 4 MG: 4 TABLET ORAL at 20:49

## 2020-03-26 RX ADMIN — BUSPIRONE HYDROCHLORIDE 5 MG: 10 TABLET ORAL at 13:44

## 2020-03-26 RX ADMIN — SERTRALINE 50 MG: 50 TABLET, FILM COATED ORAL at 08:27

## 2020-03-26 RX ADMIN — BUSPIRONE HYDROCHLORIDE 5 MG: 10 TABLET ORAL at 21:19

## 2020-03-26 RX ADMIN — BUSPIRONE HYDROCHLORIDE 5 MG: 10 TABLET ORAL at 06:11

## 2020-03-26 RX ADMIN — PANTOPRAZOLE SODIUM 40 MG: 40 TABLET, DELAYED RELEASE ORAL at 08:28

## 2020-03-26 NOTE — ACP (ADVANCE CARE PLANNING)
"   LOS: 3 days   Patient Care Team:  Sher Blackburn DO as PCP - General (Family Medicine)    Chief Complaint: Patient says she is doing pretty good today.  She rates her depression and anxiety both at 3, describes symptoms as \"worry about where I am going to go\", she has slept fair about 4 hours.  Her appetite is very good she denies SI HI hallucinations or paranoia.  She denies feeling hopeless today.  She also says that she tried to call her sister last night but there was no answer.  Her temperature now is 98.0      Interval History:     Review of systems constitutional no complaints  Respiratory negative  Cardiac negative  Gastrointestinal negative  Neurologic negative      Vital Signs    Temp:  [98.3 °F (36.8 °C)-98.8 °F (37.1 °C)] 98.8 °F (37.1 °C)  Heart Rate:  [80-95] 80  Resp:  [18] 18  BP: (144-166)/(84-89) 144/84    Lab Results:   Lab Results (last 24 hours)     ** No results found for the last 24 hours. **           Labs:     Lab Results (last 24 hours)     ** No results found for the last 24 hours. **                        Exam:  Mental Status Exam:     Hygiene:   fair  Cooperation:  Cooperative  Eye Contact:  Good  Psychomotor Behavior:  Appropriate  Affect:  Restricted  Speech:  Normal  Thought Progress:  Goal directed  Thought Content:  Mood congruent  Suicidal:  None  Homicidal:  None  Hallucinations:  None  Delusion:  None  Memory:  Intact  Orientation:  Person, Place and Time  Reliability:  fair  Insight:  Fair  Judgement:  Fair  Impulse Control:  Fair      Results Review:    Lab Results (last 24 hours)     ** No results found for the last 24 hours. **          Medication Review:    Current Facility-Administered Medications:   •  aluminum-magnesium hydroxide-simethicone (MAALOX MAX) 400-400-40 MG/5ML suspension 15 mL, 15 mL, Oral, Q6H PRN, Gab Cloud MD  •  benzonatate (TESSALON) capsule 100 mg, 100 mg, Oral, TID PRN, Gab Cloud MD  •  benztropine (COGENTIN) tablet 2 mg, 2 mg, " Oral, Once PRN **OR** benztropine (COGENTIN) injection 1 mg, 1 mg, Intramuscular, Once PRN, Gab Cloud MD  •  busPIRone (BUSPAR) tablet 5 mg, 5 mg, Oral, Q8H, Onelia Valladares MD, 5 mg at 03/26/20 0611  •  cefdinir (OMNICEF) capsule 300 mg, 300 mg, Oral, Q12H, Gab Cloud MD, 300 mg at 03/26/20 0828  •  famotidine (PEPCID) tablet 20 mg, 20 mg, Oral, BID PRN, Gab Cloud MD  •  hydrOXYzine (ATARAX) tablet 25 mg, 25 mg, Oral, Q6H PRN, Onelia Valladares MD  •  ibuprofen (ADVIL,MOTRIN) tablet 400 mg, 400 mg, Oral, Q6H PRN, Gab Cloud MD  •  loperamide (IMODIUM) capsule 2 mg, 2 mg, Oral, Q2H PRN, Gab Cloud MD  •  magnesium hydroxide (MILK OF MAGNESIA) suspension 2400 mg/10mL 10 mL, 10 mL, Oral, Daily PRN, Gab Cloud MD  •  ondansetron (ZOFRAN) tablet 4 mg, 4 mg, Oral, Q6H PRN, Gab Cloud MD  •  pantoprazole (PROTONIX) EC tablet 40 mg, 40 mg, Oral, Daily, Gab Cloud MD, 40 mg at 03/26/20 0828  •  sertraline (ZOLOFT) tablet 50 mg, 50 mg, Oral, Daily, Onelia Valladares MD, 50 mg at 03/26/20 0827  •  sodium chloride nasal spray 2 spray, 2 spray, Each Nare, PRN, Gab Cloud MD  •  traZODone (DESYREL) tablet 50 mg, 50 mg, Oral, Nightly PRN, Gab Cloud MD, 50 mg at 03/25/20 2102     Special Precautions Level: III        Assessment/Plan     Assessment:    Assessment: Assessment/Diagnosis: Depression NOS rule out major depression single episode without,psychosis  Zoloft 50 mg daily     Anxiety NOS  BuSpar 5 mg 3 times daily  Decrease Vistaril to 25 mg every 6 hours as needed  UTI  Plancefdinir 300 mg twice daily for 7 days      Treatment Plan: Patient is ready for discharge.  Will discharge when the therapist confirms the discharge disposition          I have reviewed and approved the behavioral health treatment plans and problem list. Yes        Onelia Valladares MD  03/26/20  09:52

## 2020-03-26 NOTE — DISCHARGE INSTR - APPOINTMENTS
[facility]  [address] [place] [address]  [phone]    You are accepted to [facility] on 03/27/20.          [facility]  [address]  [place] [address] [phone]   Please call for bed availability upon discharge.        [facility]  [address] [place] [address] [phone]  You have appointment scheduled on 04/09/20 at 1:00 PM.

## 2020-03-26 NOTE — PLAN OF CARE
Pt calm and cooperative. Pt reports sleeping and eating well. Rates A/D 2/8. Denies SI/HI or hallucinations. Reports feeling hopeless. Denies feeling helpless or worthless.

## 2020-03-26 NOTE — PROGRESS NOTES
Covering therapist reviewed patient's chart and staffed with Dr. Valladares.  Patient noted to provide verbal consent for [facility], [facility], and her sister Ariela.  Therapist spoke with Ariela via phone.  She reported refusing for patient to live with her due to patient being paranoid and destructive in the past, and stealing from Ariela.  She stated that patient has bipolar mood swings and goes into rages.  Therapist spoke with Kendra at [facility] via phone and informed that they do not have any open beds at this time and unable to accept patient.  Kendra stated that patient became very angry and aggressive with peers in shelter, and that patient left on her own.  She reported for patient to contact shelter at [phone] in a few days to see if there is bed opening.  Therapist spoke with Britni at [facility] who reports they are able to accept patient for tomorrow at 11:00 AM.      Data:     Covering therapist met with patient, who was agreeable, for individual session.  Continued to discuss progress and treatment goals.  Educated patient about importance of safety and aftercare plans.  Educated patient of appropriate hygeine and hand washing techniques.    Therapist reviewed above disposition planning efforts. Patient reported feeling as if her sister as abandoned her and that she does not have any other family.  Patient discussed she had lived in an apartment in the past when she had money saved but she does not have any income now and had been denied for SSI.  Patient reported feeling hopeless and anxious.  She reported being agreeable to attend [facility] tomorrow upon discharge.  Patient strongly encouraged to comply with all aftercare appointments and medication regime; patient receptive.  She denied concerns.    Assessment:    Patient is observed to display restricted affect and anxious mood.  Patient denied suicidal ideation or plan.  She denied HI and AVH.  Patient oriented x3 with fair  insight.    Plan:    Therapist will continue to assist daily with aftercare and safety planning as needed.  Patient accepted to [facility] tomorrow upon discharge with 11 AM .

## 2020-03-26 NOTE — PLAN OF CARE
Problem: Patient Care Overview  Goal: Plan of Care Review  3/26/2020 1710 by Jason Adan RN  Outcome: Ongoing (interventions implemented as appropriate)  Flowsheets (Taken 3/26/2020 1706)  Plan of Care Reviewed With: patient  Patient Agreement with Plan of Care: agrees  Note:   Patient rates anx 4 and dep 8 Denies SI, HI, and AVH states she doesn't want to hurt herself or anyone else. Reports she has a good appetite and sleeping well. Maintains in her room isolated most of day calm and cooperative.

## 2020-03-27 VITALS
RESPIRATION RATE: 18 BRPM | BODY MASS INDEX: 27.66 KG/M2 | HEART RATE: 81 BPM | WEIGHT: 172.13 LBS | OXYGEN SATURATION: 95 % | HEIGHT: 66 IN | SYSTOLIC BLOOD PRESSURE: 135 MMHG | TEMPERATURE: 98.5 F | DIASTOLIC BLOOD PRESSURE: 85 MMHG

## 2020-03-27 PROCEDURE — 63710000001 ONDANSETRON PER 8 MG: Performed by: PSYCHIATRY & NEUROLOGY

## 2020-03-27 RX ORDER — CEFDINIR 300 MG/1
300 CAPSULE ORAL EVERY 12 HOURS SCHEDULED
Qty: 13 CAPSULE | Refills: 0 | Status: SHIPPED | OUTPATIENT
Start: 2020-03-27 | End: 2020-04-03

## 2020-03-27 RX ORDER — BUSPIRONE HYDROCHLORIDE 5 MG/1
5 TABLET ORAL EVERY 8 HOURS SCHEDULED
Qty: 45 TABLET | Refills: 0 | Status: ON HOLD | OUTPATIENT
Start: 2020-03-27 | End: 2020-04-07

## 2020-03-27 RX ORDER — TRAZODONE HYDROCHLORIDE 50 MG/1
50 TABLET ORAL NIGHTLY PRN
Qty: 15 TABLET | Refills: 0 | Status: ON HOLD | OUTPATIENT
Start: 2020-03-27 | End: 2020-04-13

## 2020-03-27 RX ADMIN — PANTOPRAZOLE SODIUM 40 MG: 40 TABLET, DELAYED RELEASE ORAL at 08:02

## 2020-03-27 RX ADMIN — BUSPIRONE HYDROCHLORIDE 5 MG: 10 TABLET ORAL at 05:54

## 2020-03-27 RX ADMIN — ONDANSETRON 4 MG: 4 TABLET ORAL at 08:02

## 2020-03-27 RX ADMIN — SERTRALINE 50 MG: 50 TABLET, FILM COATED ORAL at 08:02

## 2020-03-27 RX ADMIN — CEFDINIR 300 MG: 300 CAPSULE ORAL at 08:02

## 2020-03-27 NOTE — PROGRESS NOTES
Behavioral Health Discharge Summary             Please fax within 24 hours of discharge to [facility] at: [phone]      Member Name: Tigist Bates Member ID: [id number]   Authorization Number: [id number] Phone: [phone]   Member Address: [address] [place] [address] [place] [id number]   Discharge Date: 03/27/2020 Level of Care at Discharge:    Facility: [facility] Staff Completing Form: Enedina MONIQUE RN    If the member is being discharged directly to a residential or extended care program, please specify the type below.   __Private Child-Caring Facility (PCC) Residential/Group Home   __Private Child-Caring Facility (PCC) Therapeutic Foster Care   __Residential Treatment Facility (RTF)   __Psychiatric Residential Treatment Facility (PRTF I or II)   __Long-Term Acute Inpatient Hospital Services or Extended Care Unit (ECU)   __Other (please specify):    Brief discharge summary of treatment received (for follow up by the case management team): D/C clinical with list of medications and follow up appts given to patient upon discharge.     BRIEF SUMMARY OF RECOMMENDATIONS FOR ONGOING TREATMENT     Discharged to where: [facility]   Discharge diagnoses: F 32.9   Axis I:    Axis II:    Axis III:    Axis IV:    Axis V:    Does the member understand his/her DX?  Yes          Medication     Dose     Schedule Supply/  Quantity  Given at Discharge RX Provided  Yes/No  If Rx Provided, Quantity RX Prior Auth Required  Yes/No Prior Auth  Completed                                                                                             Does the member understand the reason for taking these medications? Yes                                                           FOLLOW-UP APPOINTMENTS   Please schedule within 7 days of discharge and provide appointment details for all referred services.    PCP/Other Providers Involved in Treatment:    Appointment Type: JITENDRA TAVERAS  Provider Name: Oscar  KODY   Provider Phone:  [phone]    Appointment Date: 04/09/2020 Appointment Time: 1:00 PM      Assessment   (new to OP services)        Case Management    Is the member already enrolled in case management?  Yes/No  If yes, date the CM was notified:    If no, was the CM referral offered?  Yes/No  Accepted? Yes/No    Is the Release of Information in the chart? Yes/No:      Medication Management (for member discharged with psychiatric medications):      A&D Treatment (for member with substance abuse/   dependence in the past year):      Medical Condition (for member with a medical condition):    Other recommended treatment:    Do you have any concerns about the discharge plan?  No    If yes, explain:    Was the member involved in the discharge planning?  Yes    If no, explain:    Was a copy of the discharge plan provided to the member?  Yes    If no, explain:

## 2020-03-27 NOTE — PLAN OF CARE
Problem: Patient Care Overview  Goal: Interprofessional Rounds/Family Conf  3/27/2020 1004 by Roselyn Hudsno  Flowsheets (Taken 3/27/2020 1004)  Participants: milieu/psych techs; nursing; social work; psychiatrist; other (see comments) (Navigator)  Summary: Staffed with treatment team.     Problem: Overarching Goals (Adult)  Goal: Optimized Coping Skills in Response to Life Stressors  Intervention: Promote Effective Coping Strategies  Flowsheets (Taken 3/27/2020 1004)  Supportive Measures: active listening utilized; counseling provided; problem solving facilitated; verbalization of feelings encouraged     Data:     Therapist met with patient for individual session.  Continued to discuss progress and treatment goals.  Educated patient about importance of safety and aftercare plans.    Patient reported feeling improved today but somewhat nervous.  She discussed stressor of lack of support system.  Therapist assisted patient to identify and process healthy coping skills.  Patient discussed journaling and walking as healthy coping skills.  Patient educated that referral for therapy, case management, and medication management has been completed with KODY Moore per her request; patient agreeable.  She reports having her own transportation here; therapist confirmed [facility] staff will meet patient at hospital for patient to follow them to facility, patient agreeable.  She denied concerns.  Patient planned to discharge today.    Assessment:    Patient is observed to display restricted affect and anxious mood.  Patient adamantly denied any thoughts or plans to harm herself or others.  She denied AVH and appeared oriented x3 with fair insight.  She appeared calm and cooperative.  She reported improved sleep.    Plan:    Patient accepted to [facility] today upon discharge.  Patient accepted to [facility] shelter and awaiting bed opening.  Patient has aftercare appointment scheduled with KODY Moore on  04/09/20.

## 2020-03-27 NOTE — DISCHARGE SUMMARY
Date of Discharge:  3/27/2020    Presenting Problem/History of Present Illness  MDD (major depressive disorder) [F32.9]       Hospital Course  Patient was admitted on 3/23/2020 after she presented to the hospital reporting congestion cough and fever and also reported being homeless.  She was medically cleared and admitted to psychiatry because she reported suicidal thoughts since last week  and also reported plan to either overdose or use a gun but said that she did not have access to the gun and denied any history of previous suicidal attempts.  She was placed on special precautions level 3 and also placed on Omnicef 300 mg twice daily for UTI from the emergency room.  I saw her on 3/24/2020 when I did the initial history and physical examination noted the patient was reporting depression and anxiety due to her current situation but denied SI HI hallucinations or paranoia.  She was also well oriented, I started her on Zoloft 50 mg daily and BuSpar 5 mg 3 times daily.  Patient was seen daily, she remained concerned about her current situation that is being homeless but continued to deny SI HI hallucinations or paranoia.  She was referred to therapist who was able to arrange a bed for her at [facility] on 3/27/2020.  Patient was seen by me on this date.  She continued to report depression and anxiety about her current situation, I provided her's supportive counseling and also informed her that therapist has told me that [facility] was willing to accept her while she is in [facility] once they have a bed.  Patient was told about it and she felt better.  Therapist was also to arrange case management referral for the patient while she is in [facility].  Patient remained afebrile throughout her stay in the hospital  Procedures Performed  Nursing wants to make appointment with family physician for follow-up within 1 to 2 weeks       Consults:   Consults     No orders found from 2/23/2020 to 3/24/2020.          Pertinent  Test Results: CMP showed elevated glucose at 110, anion gap at 15.4 ALT at 157 and AST at 85  Otitis profile was nonreactive, HIV was nonreactive  Serum alcohol level prior to admission was less than 10  Urinalysis showed 2+ leukocytes 6-12 WBCs no bacteria  Urine drug screen was negative  EKG showed normal sinus rhythm        Discharge Disposition      Discharge Medications     Your medication list      ASK your doctor about these medications      Instructions Last Dose Given Next Dose Due   amoxicillin 500 MG capsule  Commonly known as:  AMOXIL  Ask about: Should I take this medication?      Take 1,000 mg by mouth 2 (Two) Times a Day.       pantoprazole 40 MG EC tablet  Commonly known as:  PROTONIX      Take 40 mg by mouth Daily.              Lab Results (last 24 hours)     ** No results found for the last 24 hours. **        Follow-up Appointments  No future appointments.      Discharge Diagnosis: Depression NOS  Rule out major depression single episode without psychosis  Anxiety NOS and  UTI          Onelia Valladares MD  03/27/20  09:32

## 2020-03-27 NOTE — PLAN OF CARE
Pt reports fair sleep and a good appetite. Rates A/D 3/3. Denies SI/HI or hallucinations. Pt reports feeling helpless, hopeless and worthless.

## 2020-03-28 LAB
BACTERIA SPEC AEROBE CULT: NORMAL
BACTERIA SPEC AEROBE CULT: NORMAL

## 2020-04-07 ENCOUNTER — HOSPITAL ENCOUNTER (EMERGENCY)
Facility: HOSPITAL | Age: 60
Discharge: STILL ADMITTED OR ADMINISTRATIVE | End: 2020-04-07
Attending: EMERGENCY MEDICINE

## 2020-04-07 ENCOUNTER — HOSPITAL ENCOUNTER (INPATIENT)
Facility: HOSPITAL | Age: 60
LOS: 6 days | Discharge: HOME | End: 2020-04-13
Attending: PSYCHIATRY & NEUROLOGY | Admitting: PSYCHIATRY & NEUROLOGY

## 2020-04-07 VITALS
HEART RATE: 90 BPM | HEIGHT: 66 IN | OXYGEN SATURATION: 97 % | SYSTOLIC BLOOD PRESSURE: 138 MMHG | RESPIRATION RATE: 20 BRPM | WEIGHT: 175 LBS | BODY MASS INDEX: 28.12 KG/M2 | DIASTOLIC BLOOD PRESSURE: 86 MMHG | TEMPERATURE: 99.1 F

## 2020-04-07 DIAGNOSIS — F41.9 ANXIETY: Primary | ICD-10-CM

## 2020-04-07 PROBLEM — F32.A DEPRESSION WITH SUICIDAL IDEATION: Status: ACTIVE | Noted: 2020-04-07

## 2020-04-07 PROBLEM — R45.851 DEPRESSION WITH SUICIDAL IDEATION: Status: ACTIVE | Noted: 2020-04-07

## 2020-04-07 LAB
6-ACETYL MORPHINE: NEGATIVE
ALBUMIN SERPL-MCNC: 4.49 G/DL (ref 3.5–5.2)
ALBUMIN/GLOB SERPL: 1.3 G/DL
ALP SERPL-CCNC: 113 U/L (ref 39–117)
ALT SERPL W P-5'-P-CCNC: 55 U/L (ref 1–33)
AMPHET+METHAMPHET UR QL: NEGATIVE
ANION GAP SERPL CALCULATED.3IONS-SCNC: 15.4 MMOL/L (ref 5–15)
AST SERPL-CCNC: 34 U/L (ref 1–32)
BACTERIA UR QL AUTO: NORMAL /HPF
BARBITURATES UR QL SCN: NEGATIVE
BASOPHILS # BLD AUTO: 0.06 10*3/MM3 (ref 0–0.2)
BASOPHILS NFR BLD AUTO: 0.6 % (ref 0–1.5)
BENZODIAZ UR QL SCN: NEGATIVE
BILIRUB SERPL-MCNC: 0.6 MG/DL (ref 0.2–1.2)
BILIRUB UR QL STRIP: NEGATIVE
BUN BLD-MCNC: 8 MG/DL (ref 6–20)
BUN/CREAT SERPL: 9.3 (ref 7–25)
BUPRENORPHINE SERPL-MCNC: NEGATIVE NG/ML
CALCIUM SPEC-SCNC: 9.1 MG/DL (ref 8.6–10.5)
CANNABINOIDS SERPL QL: NEGATIVE
CHLORIDE SERPL-SCNC: 107 MMOL/L (ref 98–107)
CLARITY UR: CLEAR
CO2 SERPL-SCNC: 19.6 MMOL/L (ref 22–29)
COCAINE UR QL: NEGATIVE
COLOR UR: YELLOW
CREAT BLD-MCNC: 0.86 MG/DL (ref 0.57–1)
DEPRECATED RDW RBC AUTO: 47.6 FL (ref 37–54)
EOSINOPHIL # BLD AUTO: 0.39 10*3/MM3 (ref 0–0.4)
EOSINOPHIL NFR BLD AUTO: 4.2 % (ref 0.3–6.2)
ERYTHROCYTE [DISTWIDTH] IN BLOOD BY AUTOMATED COUNT: 14.2 % (ref 12.3–15.4)
ETHANOL BLD-MCNC: <10 MG/DL (ref 0–10)
ETHANOL UR QL: <0.01 %
GFR SERPL CREATININE-BSD FRML MDRD: 68 ML/MIN/1.73
GLOBULIN UR ELPH-MCNC: 3.4 GM/DL
GLUCOSE BLD-MCNC: 120 MG/DL (ref 65–99)
GLUCOSE UR STRIP-MCNC: NEGATIVE MG/DL
HCT VFR BLD AUTO: 44.9 % (ref 34–46.6)
HGB BLD-MCNC: 14.4 G/DL (ref 12–15.9)
HGB UR QL STRIP.AUTO: ABNORMAL
HYALINE CASTS UR QL AUTO: NORMAL /LPF
IMM GRANULOCYTES # BLD AUTO: 0.03 10*3/MM3 (ref 0–0.05)
IMM GRANULOCYTES NFR BLD AUTO: 0.3 % (ref 0–0.5)
KETONES UR QL STRIP: NEGATIVE
LEUKOCYTE ESTERASE UR QL STRIP.AUTO: ABNORMAL
LYMPHOCYTES # BLD AUTO: 2.23 10*3/MM3 (ref 0.7–3.1)
LYMPHOCYTES NFR BLD AUTO: 24.1 % (ref 19.6–45.3)
MAGNESIUM SERPL-MCNC: 2.2 MG/DL (ref 1.6–2.6)
MCH RBC QN AUTO: 29.3 PG (ref 26.6–33)
MCHC RBC AUTO-ENTMCNC: 32.1 G/DL (ref 31.5–35.7)
MCV RBC AUTO: 91.4 FL (ref 79–97)
METHADONE UR QL SCN: NEGATIVE
MONOCYTES # BLD AUTO: 0.71 10*3/MM3 (ref 0.1–0.9)
MONOCYTES NFR BLD AUTO: 7.7 % (ref 5–12)
NEUTROPHILS # BLD AUTO: 5.82 10*3/MM3 (ref 1.7–7)
NEUTROPHILS NFR BLD AUTO: 63.1 % (ref 42.7–76)
NITRITE UR QL STRIP: NEGATIVE
NRBC BLD AUTO-RTO: 0 /100 WBC (ref 0–0.2)
OPIATES UR QL: NEGATIVE
OXYCODONE UR QL SCN: NEGATIVE
PCP UR QL SCN: NEGATIVE
PH UR STRIP.AUTO: <=5 [PH] (ref 5–8)
PLATELET # BLD AUTO: 297 10*3/MM3 (ref 140–450)
PMV BLD AUTO: 9.4 FL (ref 6–12)
POTASSIUM BLD-SCNC: 3.8 MMOL/L (ref 3.5–5.2)
PROT SERPL-MCNC: 7.9 G/DL (ref 6–8.5)
PROT UR QL STRIP: NEGATIVE
RBC # BLD AUTO: 4.91 10*6/MM3 (ref 3.77–5.28)
RBC # UR: NORMAL /HPF
REF LAB TEST METHOD: NORMAL
SODIUM BLD-SCNC: 142 MMOL/L (ref 136–145)
SP GR UR STRIP: 1.02 (ref 1–1.03)
SQUAMOUS #/AREA URNS HPF: NORMAL /HPF
UROBILINOGEN UR QL STRIP: ABNORMAL
WBC NRBC COR # BLD: 9.24 10*3/MM3 (ref 3.4–10.8)
WBC UR QL AUTO: NORMAL /HPF

## 2020-04-07 PROCEDURE — 80307 DRUG TEST PRSMV CHEM ANLYZR: CPT | Performed by: PHYSICIAN ASSISTANT

## 2020-04-07 PROCEDURE — 83735 ASSAY OF MAGNESIUM: CPT | Performed by: PHYSICIAN ASSISTANT

## 2020-04-07 PROCEDURE — 99284 EMERGENCY DEPT VISIT MOD MDM: CPT

## 2020-04-07 PROCEDURE — 80053 COMPREHEN METABOLIC PANEL: CPT | Performed by: PHYSICIAN ASSISTANT

## 2020-04-07 PROCEDURE — 93010 ELECTROCARDIOGRAM REPORT: CPT | Performed by: INTERNAL MEDICINE

## 2020-04-07 PROCEDURE — 85025 COMPLETE CBC W/AUTO DIFF WBC: CPT | Performed by: PHYSICIAN ASSISTANT

## 2020-04-07 PROCEDURE — 81001 URINALYSIS AUTO W/SCOPE: CPT | Performed by: PHYSICIAN ASSISTANT

## 2020-04-07 PROCEDURE — 93005 ELECTROCARDIOGRAM TRACING: CPT | Performed by: PSYCHIATRY & NEUROLOGY

## 2020-04-07 RX ORDER — ALUMINA, MAGNESIA, AND SIMETHICONE 2400; 2400; 240 MG/30ML; MG/30ML; MG/30ML
15 SUSPENSION ORAL EVERY 6 HOURS PRN
Status: DISCONTINUED | OUTPATIENT
Start: 2020-04-07 | End: 2020-04-13 | Stop reason: HOSPADM

## 2020-04-07 RX ORDER — PANTOPRAZOLE SODIUM 40 MG/1
40 TABLET, DELAYED RELEASE ORAL DAILY
Status: DISCONTINUED | OUTPATIENT
Start: 2020-04-08 | End: 2020-04-13 | Stop reason: HOSPADM

## 2020-04-07 RX ORDER — ACETAMINOPHEN 325 MG/1
650 TABLET ORAL EVERY 6 HOURS PRN
Status: DISCONTINUED | OUTPATIENT
Start: 2020-04-07 | End: 2020-04-07

## 2020-04-07 RX ORDER — BUSPIRONE HYDROCHLORIDE 5 MG/1
5 TABLET ORAL 2 TIMES DAILY
Status: ON HOLD | COMMUNITY
End: 2020-04-13 | Stop reason: SDUPTHER

## 2020-04-07 RX ORDER — BENZTROPINE MESYLATE 1 MG/ML
1 INJECTION INTRAMUSCULAR; INTRAVENOUS ONCE AS NEEDED
Status: DISCONTINUED | OUTPATIENT
Start: 2020-04-07 | End: 2020-04-13 | Stop reason: HOSPADM

## 2020-04-07 RX ORDER — BENZTROPINE MESYLATE 1 MG/1
2 TABLET ORAL ONCE AS NEEDED
Status: DISCONTINUED | OUTPATIENT
Start: 2020-04-07 | End: 2020-04-13 | Stop reason: HOSPADM

## 2020-04-07 RX ORDER — LOPERAMIDE HYDROCHLORIDE 2 MG/1
2 CAPSULE ORAL
Status: DISCONTINUED | OUTPATIENT
Start: 2020-04-07 | End: 2020-04-13 | Stop reason: HOSPADM

## 2020-04-07 RX ORDER — BENZONATATE 100 MG/1
100 CAPSULE ORAL 3 TIMES DAILY PRN
Status: DISCONTINUED | OUTPATIENT
Start: 2020-04-07 | End: 2020-04-13 | Stop reason: HOSPADM

## 2020-04-07 RX ORDER — IBUPROFEN 400 MG/1
400 TABLET ORAL EVERY 6 HOURS PRN
Status: DISCONTINUED | OUTPATIENT
Start: 2020-04-07 | End: 2020-04-13 | Stop reason: HOSPADM

## 2020-04-07 RX ORDER — BUSPIRONE HYDROCHLORIDE 5 MG/1
5 TABLET ORAL 2 TIMES DAILY
Status: DISCONTINUED | OUTPATIENT
Start: 2020-04-07 | End: 2020-04-13 | Stop reason: HOSPADM

## 2020-04-07 RX ORDER — TRAZODONE HYDROCHLORIDE 50 MG/1
50 TABLET ORAL NIGHTLY PRN
Status: DISPENSED | OUTPATIENT
Start: 2020-04-07 | End: 2020-04-11

## 2020-04-07 RX ORDER — HYDROXYZINE 50 MG/1
50 TABLET, FILM COATED ORAL EVERY 6 HOURS PRN
Status: DISCONTINUED | OUTPATIENT
Start: 2020-04-07 | End: 2020-04-13 | Stop reason: HOSPADM

## 2020-04-07 RX ORDER — ECHINACEA PURPUREA EXTRACT 125 MG
2 TABLET ORAL AS NEEDED
Status: DISCONTINUED | OUTPATIENT
Start: 2020-04-07 | End: 2020-04-13 | Stop reason: HOSPADM

## 2020-04-07 RX ORDER — ONDANSETRON 4 MG/1
4 TABLET, FILM COATED ORAL EVERY 6 HOURS PRN
Status: DISCONTINUED | OUTPATIENT
Start: 2020-04-07 | End: 2020-04-13 | Stop reason: HOSPADM

## 2020-04-07 RX ORDER — FAMOTIDINE 20 MG/1
20 TABLET, FILM COATED ORAL 2 TIMES DAILY PRN
Status: DISCONTINUED | OUTPATIENT
Start: 2020-04-07 | End: 2020-04-13 | Stop reason: HOSPADM

## 2020-04-07 RX ADMIN — BUSPIRONE HYDROCHLORIDE 5 MG: 5 TABLET ORAL at 20:40

## 2020-04-07 RX ADMIN — TRAZODONE HYDROCHLORIDE 50 MG: 50 TABLET ORAL at 20:40

## 2020-04-07 NOTE — NURSING NOTE
Called and provided all intake information including  abnormal lab and medical information to Dr Law.Admit orders received.RBVOx2. Patient and ED provider aware.DR Law is fully aware of all abnormal labs and Temp of 99.1

## 2020-04-07 NOTE — ED PROVIDER NOTES
"Subjective   59-year-old white female presents secondary to anxiety and need for psychiatric evaluation.  She states she has been extremely nervous.  She has felt \"dazed\" no new medications.  She is a patient of Dr. Orlando.  She denies suicidal homicidal ideation.  She states her symptoms are mild to moderate.  This started approximately 3 to 4 days ago.  No other complaints.          Review of Systems   Constitutional: Negative.  Negative for fever.   HENT: Negative.    Respiratory: Negative.    Cardiovascular: Negative.  Negative for chest pain.   Gastrointestinal: Negative.  Negative for abdominal pain.   Endocrine: Negative.    Genitourinary: Negative.  Negative for dysuria.   Skin: Negative.    Neurological: Negative.    Psychiatric/Behavioral: Negative.    All other systems reviewed and are negative.      Past Medical History:   Diagnosis Date   • Anxiety    • Chronic pain    • Depression    • GERD (gastroesophageal reflux disease)    • Suicidal thoughts        No Known Allergies    Past Surgical History:   Procedure Laterality Date   • TONSILLECTOMY         Family History   Problem Relation Age of Onset   • Depression Sister        Social History     Socioeconomic History   • Marital status: Single     Spouse name: Not on file   • Number of children: Not on file   • Years of education: Not on file   • Highest education level: Not on file   Tobacco Use   • Smoking status: Former Smoker   • Smokeless tobacco: Never Used   Substance and Sexual Activity   • Alcohol use: Not Currently   • Drug use: Not Currently     Types: Benzodiazepines, Marijuana   • Sexual activity: Not Currently     Partners: Male           Objective   Physical Exam   Constitutional: She is oriented to person, place, and time. She appears well-developed and well-nourished. No distress.   HENT:   Head: Normocephalic and atraumatic.   Right Ear: External ear normal.   Left Ear: External ear normal.   Nose: Nose normal.   Eyes: Pupils are equal, " round, and reactive to light. Conjunctivae and EOM are normal.   Neck: Normal range of motion. Neck supple. No JVD present. No tracheal deviation present.   Cardiovascular: Normal rate, regular rhythm and normal heart sounds.   No murmur heard.  Pulmonary/Chest: Effort normal and breath sounds normal. No respiratory distress. She has no wheezes.   Abdominal: Soft. Bowel sounds are normal. There is no tenderness.   Musculoskeletal: Normal range of motion. She exhibits no edema or deformity.   Neurological: She is alert and oriented to person, place, and time. No cranial nerve deficit.   Skin: Skin is warm and dry. No rash noted. She is not diaphoretic. No erythema. No pallor.   Psychiatric: She has a normal mood and affect. Her behavior is normal. Thought content normal.   Nursing note and vitals reviewed.      Procedures           ED Course                                           MDM  Number of Diagnoses or Management Options  Anxiety: established and worsening     Amount and/or Complexity of Data Reviewed  Clinical lab tests: reviewed and ordered  Discuss the patient with other providers: yes    Risk of Complications, Morbidity, and/or Mortality  Presenting problems: moderate        Final diagnoses:   Anxiety            Mikhail Gonzales PA  04/07/20 [id number]

## 2020-04-07 NOTE — NURSING NOTE
"Presented to ED with suicidal thoughts to shoot herself.  Reports that she has been homeless since Feb 27th, is unemployed, and unable to afford rent.  Reports that she has been feeling very \"nervous and confused\" and states, \"I can't get settled down.  I drove around for 2 days, just driving.\"  Also, reports visual hallucinations.  States that she has been seeing a specific  all around.  States that she does not personally know this .  Was last admitted to [facility] on 3/23.  Was discharged to [facility], and was theres until 4/2.  Has been staying with a cousin since that time, but states that she will not be returning there upon discharge.  Reports that she has been compliant with medication since discharge.  Follow up with [facility] was scheduled for 4/9/20.    "

## 2020-04-07 NOTE — NURSING NOTE
Full intake assessment completed awaiting Lab results.Patient presented to ED with complaints of suicidal thoughts with a plan to use a firearm. Patient reported she is homeless and if this is all there is to living she is finished with it. Patient reported anxiety and depression 10/10. Patient reported stressors as being homeless.. Patient reported poor sleep and appetite. Patient denies all drugs and alcohol. Patient reported having a hallucination of seeing a certain  everywhere. Patient reported she hasn't slept in a couple of days and has been driving around aimlessly for the last 2 days.

## 2020-04-08 PROCEDURE — 63710000001 ONDANSETRON PER 8 MG: Performed by: PSYCHIATRY & NEUROLOGY

## 2020-04-08 RX ORDER — TRAZODONE HYDROCHLORIDE 50 MG/1
50 TABLET ORAL NIGHTLY PRN
Status: DISCONTINUED | OUTPATIENT
Start: 2020-04-08 | End: 2020-04-08

## 2020-04-08 RX ADMIN — LOPERAMIDE HYDROCHLORIDE 2 MG: 2 CAPSULE ORAL at 20:54

## 2020-04-08 RX ADMIN — SERTRALINE 50 MG: 50 TABLET, FILM COATED ORAL at 08:40

## 2020-04-08 RX ADMIN — ONDANSETRON HYDROCHLORIDE 4 MG: 4 TABLET, FILM COATED ORAL at 08:40

## 2020-04-08 RX ADMIN — PANTOPRAZOLE SODIUM 40 MG: 40 TABLET, DELAYED RELEASE ORAL at 08:40

## 2020-04-08 RX ADMIN — TRAZODONE HYDROCHLORIDE 50 MG: 50 TABLET ORAL at 20:54

## 2020-04-08 RX ADMIN — BUSPIRONE HYDROCHLORIDE 5 MG: 5 TABLET ORAL at 20:54

## 2020-04-08 RX ADMIN — BUSPIRONE HYDROCHLORIDE 5 MG: 5 TABLET ORAL at 08:40

## 2020-04-08 NOTE — DISCHARGE INSTR - APPOINTMENTS
[facility]  [address] [place] [address]  [phone]    April 16 2020 at 11:00am with Amanda    [facility] TEAM REFERRAL  An employee from the [facility] will contact you with an appointment date and time.

## 2020-04-08 NOTE — PLAN OF CARE
Problem: Patient Care Overview  Goal: Plan of Care Review  Outcome: Ongoing (interventions implemented as appropriate)  Flowsheets (Taken 4/8/2020 0151)  Progress: no change  Plan of Care Reviewed With: patient  Patient Agreement with Plan of Care: agrees

## 2020-04-08 NOTE — PLAN OF CARE
Problem: Patient Care Overview  Goal: Plan of Care Review  Flowsheets (Taken 4/8/2020 1001)  Consent Given to Review Plan with: KODY Moore  Progress: no change  Plan of Care Reviewed With: patient  Patient Agreement with Plan of Care: agrees  Outcome Summary: Completed initial assessment, discussed alternative aftercare resources and expectations of treatment; reviewed treatment plan.     Problem: Patient Care Overview  Goal: Individualization and Mutuality  Flowsheets (Taken 4/8/2020 1001)  Patient Personal Strengths: expressive of emotions; expressive of needs; motivated for treatment; resilient; resourceful  Patient Vulnerabilities: Ineffective coping skills, poor insight.  Patient Specific Goals (Include Timeframe): Patient to identify 3 healthy coping skills, complete safety planning, and deny all SI, HI, and AVH prior to discharge.  Patient Specific Interventions: Patient to access psychiatric evaluation, medication management, individual and group CBT therapist sessions during admission.  What Information Would Help Us Give You More Personalized Care?: None  How Would You and/or Your Support Person Like to Participate in Your Care?: Declined  What Anxieties, Fears, Concerns, or Questions Do You Have About Your Care?: None  Patient Specific Preferences: Mood stabilization.     Problem: Patient Care Overview  Goal: Discharge Needs Assessment  Flowsheets (Taken 4/8/2020 1001)  Outpatient/Agency/Support Group Needs: outpatient counseling; outpatient medication management; outpatient psychiatric care (specify)  Discharge Coordination/Progress: Patient anticipated to have shelter referral and follow up with KODY Moore upon discharge.  She has insurance for medications.  Transportation Anticipated: car, drives self  Anticipated Discharge Disposition: home or self-care  Current Discharge Risk: psychiatric illness; lack of support system/caregiver; homeless; financial support inadequate  Concerns to be  Addressed: coping/stress; decision making; homelessness; medication; mental health; discharge planning  Readmission Within the Last 30 Days: no previous admission in last 30 days  Patient/Family Anticipated Services at Transition: mental health services  Patient's Choice of Community Agency(s): [facility]  Patient/Family Anticipates Transition to: shelter     Problem: Patient Care Overview  Goal: Interprofessional Rounds/Family Conf  Flowsheets (Taken 4/8/2020 1001)  Participants: milieu/psych techs; nursing; social work; psychiatrist; other (see comments) (Navigator)  Summary: Therapist to staff patient's case with treatment team during admission.     Problem: Overarching Goals (Adult)  Goal: Optimized Coping Skills in Response to Life Stressors  Intervention: Promote Effective Coping Strategies  Flowsheets (Taken 4/8/2020 1001)  Supportive Measures: counseling provided; active listening utilized; problem solving facilitated; relaxation techniques promoted; self-reflection promoted; verbalization of feelings encouraged     Problem: Overarching Goals (Adult)  Goal: Develops/Participates in Therapeutic [place] to Support Successful Transition  Intervention: Foster Therapeutic [place]  Flowsheets (Taken 4/8/2020 1001)  Trust Relationship/Rapport: care explained; choices provided; emotional support provided; empathic listening provided; questions answered; questions encouraged; reassurance provided; thoughts/feelings acknowledged     Problem: Overarching Goals (Adult)  Goal: Develops/Participates in Therapeutic [place] to Support Successful Transition  Intervention: Mutually Develop Transition Plan  Flowsheets (Taken 4/8/2020 1001)  Transition Support: community resources reviewed; crisis management plan promoted; follow-up care discussed; follow-up care coordinated       DATA:         Therapist met individually with patient this date to introduce role and to discuss hospitalization expectations. Patient  agreeable.     Tigist is a 59 year old  female who is homeless living in her car in [place].  She presents as voluntary admit with reports of suicidal ideations and plan to shoot herself.  She also reported visual hallucination of seeing a , and seeing a [facility] staff member's face.  Patient reports worsening depression and anxiety for past week.  She was last discharged from [facility] on 03/27/20 and went to [facility].  Patient then went to live with her cousin and stated she declines to return there because they did not get along.  Patient reported feeling nervous, confused, hopeless, helpless.  She discussed stressors of being homeless since February 2020, lacking income, being unemployed since 2015, and living in her car.  Patient reported having no support system at this time.  She currently reports suicidal ideations to this therapist and having visual hallucination this morning of seeing a [facility] staff member.  Patient denied substance abuse issues.  Her UDS was negative.  Patient denied legal issues.  She denied history of abuse.  Patient admitted for safety and stabilization.    Therapist staffed with Navigator who reports [facility] is not accepting people at this time.  Navigator also reports that [facility] is not accepting people.  Therapist reviewed this with patient who stated she will consider shelter in other areas and also think about returning to cousin's home.  Patient refused for family contact.     Clinical Maneuvering/Intervention:     Therapist assisted patient in processing above session content; acknowledged and normalized patient’s thoughts, feelings, and concerns.  Discussed the therapist/patient relationship and explain the parameters and limitations of relative confidentiality.  Also discussed the importance of active participation, and honesty to the treatment process.  Encouraged the patient to discuss/vent their  feelings, frustrations, and fears concerning their ongoing medical issues and validated their feelings.     Discussed the importance of finding enjoyable activities and coping skills that the patient can engage in a regular basis. Discussed healthy coping skills such as distraction, self love, grounding, thought challenges/reframing, etc.  Provided patient with list of healthy coping skills this date. Discussed the importance of medication compliance.  Praised the patient for seeking help and spent the majority of the session building rapport.       Allowed patient to freely discuss issues without interruption or judgment. Provided safe, confidential environment to facilitate the development of positive therapeutic relationship and encourage open, honest communication.      Therapist addressed discharge safety planning this date. Assisted patient in identifying risk factors which would indicate the need for higher level of care after discharge;  including thoughts to harm self or others and/or self-harming behavior. Encouraged patient to call 911, or present to the nearest emergency room should any of these events occur. Discussed crisis intervention services and means to access.  Encouraged securing any objects of harm.       Therapist completed integrated summary, treatment plan, and initiated social history this date.  Therapist is strongly encouraging family involvement in treatment.       ASSESSMENT:      The patient displayed restricted affect and depressed mood.  She reported suicidal ideations and visual hallucination to therapist.  Patient denied HI.  She appeared oriented x3 with poor insight.  She reported poor sleep and having shakes from anxiety.  She reported poor appetite.     PLAN:       Patient to remain hospitalized this date.     Treatment team will focus efforts on stabilizing patient's acute symptoms while providing education on healthy coping and crisis management to reduce hospitalizations.    Patient requires daily psychiatrist evaluation and 24/7 nursing supervision to promote patient  safety.     Therapist will offer 1-4 individual sessions, 1 therapy group daily, family education, and appropriate referral.    Patient consented for [facility].  She is consider homeless shelter referrals to farther areas.

## 2020-04-08 NOTE — PLAN OF CARE
Problem: Patient Care Overview  Goal: Plan of Care Review  Outcome: Ongoing (interventions implemented as appropriate)  Flowsheets (Taken 4/8/2020 1512)  Progress: no change  Plan of Care Reviewed With: patient  Patient Agreement with Plan of Care: agrees  Note:   Out in dayroom some today. Denies si/hi. Reports visual hallucinations and paranoia.Calm and cooperative.

## 2020-04-08 NOTE — H&P
"Patient Care Team:  Jewels Villela,  as PCP - General (Obstetrics and Gynecology)    Chief Complaint: \"Trying to live in the car, have been driving back and forth between [place] and [place], had nothing else to do, very unsettled, got exhausted, could not think clearly, not able to make good decisions\"    Subjective       History of Present Illness: Patient is a 59-year-old single female never  has no children, currently homeless who was admitted on 4/7/2020 after she presented to the emergency room reporting suicidal thoughts with plans to use a firearm and also reported a hallucination, saw a  when no one was there.    I saw the patient on 4/8/2020 when she listed her chief complaint as described above.  Patient was recently released from [facility] on 3/27/2020 to [facility].  She stayed with a friend briefly after she was released from [facility] and since then has been homeless.    Substance Abuse History: Patient denies any    Legal History: Patient denies any    Past Psychiatric History: Has history of 1 recent psychiatric admission at [facility].  Patient says that she has attended [facility] in [facility] since her release from [facility], and has a  Rosemary, who has tried to help her with finding housing but has been unable to yet.      History Dr. Blackburn is a family physician, she has not seen him since early March 2020.  She says she has a stricture in her throat since 2014.  She has seen Dr. Bernal the gastroenterologist but not in several years.  She says that she has recently been getting dizzy off and on and thinks this is because she has been driving a lot  Past Medical History:   Diagnosis Date   • Anxiety    • Chronic pain    • Depression    • GERD (gastroesophageal reflux disease)    • Suicidal thoughts      Past Surgical History:   Procedure Laterality Date   • TONSILLECTOMY       Family History   Problem Relation Age of Onset "   • Depression Sister      Social History     Tobacco Use   • Smoking status: Former Smoker   • Smokeless tobacco: Never Used   Substance Use Topics   • Alcohol use: Not Currently   • Drug use: Not Currently     Types: Benzodiazepines, Marijuana     Medications Prior to Admission   Medication Sig Dispense Refill Last Dose   • busPIRone (BUSPAR) 5 MG tablet Take 5 mg by mouth 2 (Two) Times a Day.   4/7/2020 at 0700   • pantoprazole (PROTONIX) 40 MG EC tablet Take 40 mg by mouth Daily.   4/7/2020 at 0700   • sertraline (ZOLOFT) 50 MG tablet Take 1 tablet by mouth Daily for 30 days. Indications: Major Depressive Disorder 30 tablet 0 4/7/2020 at 0700   • traZODone (DESYREL) 50 MG tablet Take 1 tablet by mouth At Night As Needed for Sleep for up to 15 days. Indications: Trouble Sleeping 15 tablet 0 Unknown at Unknown time     Allergies:  Patient has no known allergies.    Review of Systems:     Constitution: No complaints  Eyes: Negative  ENT negative:    Respiratory: Negative  Cardiovascular: Negative  Gastrointestinal: Negati   Neurological: Negative     Mental Status Exam:    Hygiene:   fair  Cooperation:  Cooperative  Eye Contact:  Good  Psychomotor Behavior:  Appropriate  Affect:  Appropriate  Speech:  Normal  Thought Progress:  Goal directed  Thought Content:  Mood congruent  Suicidal:  None  Homicidal:  None  Hallucinations:  None patient says she has had visual hallucinations a few days ago when she was at her friend's home, she saw a  sitting on the couch but none since then  Delusion:  None  Memory:  Intact  Orientation:  Person, Place and Time  Reliability:  fair  Insight:  Fair  Judgement:  Fair and Impaired      Physical Exam:      General Appearance:    Alert, cooperative, in no acute distress   Head:    Normocephalic, without obvious abnormality, atraumatic   Eyes:            Lids and lashes normal, conjunctivae and sclerae normal, no   icterus, no pallor, corneas clear, PERRLA   Ears:     Ears appear intact with no abnormalities noted   Throat:   No oral lesions, no thrush, oral mucosa moist   Neck:   No adenopathy, supple, trachea midline, no thyromegaly, no     carotid bruit, no JVD   Back:     No kyphosis present, no scoliosis present, no skin lesions,       erythema or scars, no tenderness to percussion or                   palpation,   range of motion normal   Lungs:     Clear to auscultation,respirations regular, even and                   unlabored    Heart:    Regular rhythm and normal rate, normal S1 and S2, no            murmur, no gallop, no rub, no click   Breast Exam:    Deferred   Abdomen:     Normal bowel sounds, no masses, no organomegaly, soft        non-tender, non-distended, no guarding, no rebound                 tenderness   Genitalia:    Deferred   Extremities:   Moves all extremities well, no edema, no cyanosis, no              redness   Pulses:   Pulses palpable and equal bilaterally   Skin:   No bleeding, bruising or rash   Lymph nodes:   No palpable adenopathy   Neurologic:   Cranial nerves 2 - 12 grossly intact, sensation intact, DTR        present and equal bilaterally       Objective     Vital Signs    Temp:  [97.9 °F (36.6 °C)-99.1 °F (37.3 °C)] 98 °F (36.7 °C)  Heart Rate:  [70-95] 92  Resp:  [18-20] 18  BP: (130-174)/(79-90) 152/80    Lab Results:   Lab Results (last 24 hours)     ** No results found for the last 24 hours. **           Labs:     Lab Results (last 24 hours)     ** No results found for the last 24 hours. **                          Lab Results   Component Value Date    WBC 9.24 04/07/2020    HGB 14.4 04/07/2020    HCT 44.9 04/07/2020    MCV 91.4 04/07/2020     04/07/2020     Lab Results   Component Value Date    GLUCOSE 120 (H) 04/07/2020    BUN 8 04/07/2020    CREATININE 0.86 04/07/2020    EGFRIFNONA 68 04/07/2020    BCR 9.3 04/07/2020    CO2 19.6 (L) 04/07/2020    CALCIUM 9.1 04/07/2020    ALBUMIN 4.49 04/07/2020    AST 34 (H) 04/07/2020    ALT  55 (H) 04/07/2020     Pain Management Panel     Pain Management Panel Latest Ref Rng & Units 4/7/2020 3/23/2020    AMPHETAMINES SCREEN, URINE Negative Negative Negative    BARBITURATES SCREEN Negative Negative Negative    BENZODIAZEPINE SCREEN, URINE Negative Negative Negative    BUPRENORPHINEUR Negative Negative Negative    COCAINE SCREEN, URINE Negative Negative Negative    METHADONE SCREEN, URINE Negative Negative Negative          Assessment/Diagnosis: Depression NOS  Zoloft 50 mg daily  Anxiety NOS  Plan BuSpar 5 mg twice daily.  May increase the dose if needed    Plan of Care: Since main issue appears to be homelessness.  Patient says she  cannot return [facility].  Will refer to therapist to assist with discharge disposition.        Results Review: CMP and CBC are within normal limits  Urinalysis that showed trace of blood and trace of leukocytes no bacteria  Serum alcohol level prior to admission was less than 10  Urine drug screen is negative  EKG has shown normal sinus rhythm  Assessment/Plan       Depression with suicidal ideation          I have reviewed and approved the behavioral health treatment plans and problem list. Yes    Onelia Valladares MD  04/08/20  11:58

## 2020-04-09 RX ADMIN — TRAZODONE HYDROCHLORIDE 50 MG: 50 TABLET ORAL at 23:00

## 2020-04-09 RX ADMIN — IBUPROFEN 400 MG: 400 TABLET, FILM COATED ORAL at 20:32

## 2020-04-09 RX ADMIN — PANTOPRAZOLE SODIUM 40 MG: 40 TABLET, DELAYED RELEASE ORAL at 08:58

## 2020-04-09 RX ADMIN — BUSPIRONE HYDROCHLORIDE 5 MG: 5 TABLET ORAL at 08:58

## 2020-04-09 RX ADMIN — SERTRALINE 50 MG: 50 TABLET, FILM COATED ORAL at 08:58

## 2020-04-09 RX ADMIN — BUSPIRONE HYDROCHLORIDE 5 MG: 5 TABLET ORAL at 20:30

## 2020-04-09 NOTE — PLAN OF CARE
Problem: Patient Care Overview  Goal: Plan of Care Review  Outcome: Ongoing (interventions implemented as appropriate)  Flowsheets  Taken 4/8/2020 1001 by Roselyn Hudson  Consent Given to Review Plan with: KODY Moore  Outcome Summary: Completed initial assessment, discussed alternative aftercare resources and expectations of treatment; reviewed treatment plan.  Taken 4/9/2020 0979 by Ann Marie Reyna, RN  Progress: no change  Taken 4/9/2020 0845 by Alberto Montenegro, RN  Plan of Care Reviewed With: patient  Patient Agreement with Plan of Care: agrees

## 2020-04-09 NOTE — PLAN OF CARE
Problem: Patient Care Overview  Goal: Plan of Care Review  Outcome: Ongoing (interventions implemented as appropriate)  Flowsheets (Taken 4/9/2020 9854)  Progress: no change  Plan of Care Reviewed With: patient  Patient Agreement with Plan of Care: agrees  Note:   Pt alert and oriented; quiet; calm; cooperative; reports good appetite; difficulty staying asleep; rates anxiety 7; depression 5; feeling powerless; denies SI/HI/AVH;  medications reviewed; educated on social distancing and hand hygiene; pt verbalized understanding; pt noted resting well throughout the night; will continue to monitor.

## 2020-04-09 NOTE — PROGRESS NOTES
"   LOS: 2 days   Patient Care Team:  Jewels Villela DO as PCP - General (Obstetrics and Gynecology)    Chief Complaint: Patient says she is doing better she has slept better slept about 8 to 9 hours.  She is not as nervous or depressed.  She rates her anxiety at 3 and depression at 5.  Describes symptoms as \"worry about where I will go from here\" she denies SI HI hallucinations or paranoia.  She admits she is feeling hopeless and rates it at 6 or 7.       Interval History:             Vital Signs    Temp:  [97.5 °F (36.4 °C)-98 °F (36.7 °C)] 98 °F (36.7 °C)  Heart Rate:  [68-80] 80  Resp:  [18] 18  BP: (131-149)/(75-80) 134/80    Lab Results:   Lab Results (last 24 hours)     ** No results found for the last 24 hours. **           Labs:     Lab Results (last 24 hours)     ** No results found for the last 24 hours. **                        Exam:  Mental Status Exam:     Hygiene:   fair  Cooperation:  Cooperative  Eye Contact:  Good  Psychomotor Behavior:  Appropriate  Affect:  Appropriate  Speech:  Normal  Thought Progress:  Goal directed  Thought Content:  Mood congruent  Suicidal:  None  Homicidal:  None  Hallucinations:  None  Delusion:  Paranoid  Memory:  Intact  Orientation:  Person, Place and Time  Reliability:  fair  Insight:  Fair  Judgement:  Fair and Impaired  Impulse Control:  Fair and Impaired    Results Review:    Lab Results (last 24 hours)     ** No results found for the last 24 hours. **          Medication Review:    Current Facility-Administered Medications:   •  aluminum-magnesium hydroxide-simethicone (MAALOX MAX) 400-400-40 MG/5ML suspension 15 mL, 15 mL, Oral, Q6H PRN, Saroj Law MD  •  benzonatate (TESSALON) capsule 100 mg, 100 mg, Oral, TID PRN, Saroj aLw MD  •  benztropine (COGENTIN) tablet 2 mg, 2 mg, Oral, Once PRN **OR** benztropine (COGENTIN) injection 1 mg, 1 mg, Intramuscular, Once PRN, Saroj Law MD  •  busPIRone (BUSPAR) tablet 5 mg, 5 mg, Oral, BID, Ani" MD Saroj, 5 mg at 04/09/20 0858  •  famotidine (PEPCID) tablet 20 mg, 20 mg, Oral, BID PRN, Saroj Law MD  •  hydrOXYzine (ATARAX) tablet 50 mg, 50 mg, Oral, Q6H PRN, Saroj Law MD  •  ibuprofen (ADVIL,MOTRIN) tablet 400 mg, 400 mg, Oral, Q6H PRN, Saroj Law MD  •  loperamide (IMODIUM) capsule 2 mg, 2 mg, Oral, Q2H PRN, Saroj Law MD, 2 mg at 04/08/20 2054  •  magnesium hydroxide (MILK OF MAGNESIA) suspension 2400 mg/10mL 10 mL, 10 mL, Oral, Daily PRN, Saroj Law MD  •  ondansetron (ZOFRAN) tablet 4 mg, 4 mg, Oral, Q6H PRN, Saroj Law MD, 4 mg at 04/08/20 0840  •  pantoprazole (PROTONIX) EC tablet 40 mg, 40 mg, Oral, Daily, Saroj Law MD, 40 mg at 04/09/20 0858  •  sertraline (ZOLOFT) tablet 50 mg, 50 mg, Oral, Daily, Saroj Law MD, 50 mg at 04/09/20 0858  •  sodium chloride nasal spray 2 spray, 2 spray, Each Nare, PRN, Saroj Law MD  •  traZODone (DESYREL) tablet 50 mg, 50 mg, Oral, Nightly PRN, Saroj Law MD, 50 mg at 04/08/20 2054     Special Precautions Level: III        Assessment/Plan     Assessment: Depression NOS  Zoloft 50 mg daily  Anxiety NOS  BuSpar 5 mg 3 times daily  PRN Vistaril  Treatment Plan: Have discussed case with therapist.  Who will check with patient's sister if she can return home with her.  Patient has no income for personal care placement.  [facility].        I have reviewed and approved the behavioral health treatment plans and problem list. Yes      Onelia Valladares MD  04/09/20  11:36

## 2020-04-09 NOTE — PLAN OF CARE
Problem: Patient Care Overview  Goal: Interprofessional Rounds/Family Conf  Flowsheets (Taken 4/9/2020 1402)  Participants: milieu/psych techs; nursing; social work; psychiatrist  Summary: Staffed with treatment team.     Problem: Overarching Goals (Adult)  Goal: Optimized Coping Skills in Response to Life Stressors  Intervention: Promote Effective Coping Strategies  Flowsheets (Taken 4/9/2020 1402)  Supportive Measures: active listening utilized; counseling provided; verbalization of feelings encouraged; problem solving facilitated     Data:     Therapist met with patient for individual session.  Continued to discuss progress and treatment goals.  Educated patient about importance of safety and aftercare plans.    Patient reported feeling better today and less depressed, less anxious.  Therapist reviewed that all local and out of area homeless shelters are not accepting anyone currently due to COVID-19.  Reviewed that patient is not eligible for personal care home referral due to lack of income.  Patient strongly encouraged to contact family regarding disposition planning.  Patient stated she refuses to live with her cousin again as they do not get along.  Therapist assisted patient to contact sister Ariela, who stated she refuses to assist patient due to patient's history of substance abuse and stealing from family.  Patient stated she does not have any other supports.  Patient stated she would like [facility] referral upon discharge and will likely live in her car for now. Therapist reviewed mindfulness and healthy coping strategies with patient. She continues stabilization.    Therapist faxed referral to  [facility], who stated that they currently have a wait list with Monday being the earliest perceived opening.      Assessment:    Patient is observed to display restricted affect and depressed mood.  Patient denied SI or plan and stated she does not have any firearms.  She denied HI and AVH.  Patient  oriented x3 with limited insight.    Plan:    Therapist will continue to assist daily with aftercare and safety planning as needed.  Therapist sent referral to [facility] per patient's request.  They anticipate open bed on Monday.

## 2020-04-10 PROCEDURE — 63710000001 ONDANSETRON PER 8 MG: Performed by: PSYCHIATRY & NEUROLOGY

## 2020-04-10 RX ADMIN — BUSPIRONE HYDROCHLORIDE 5 MG: 5 TABLET ORAL at 20:37

## 2020-04-10 RX ADMIN — IBUPROFEN 400 MG: 400 TABLET, FILM COATED ORAL at 15:22

## 2020-04-10 RX ADMIN — BUSPIRONE HYDROCHLORIDE 5 MG: 5 TABLET ORAL at 08:37

## 2020-04-10 RX ADMIN — SERTRALINE 50 MG: 50 TABLET, FILM COATED ORAL at 08:37

## 2020-04-10 RX ADMIN — ONDANSETRON HYDROCHLORIDE 4 MG: 4 TABLET, FILM COATED ORAL at 22:01

## 2020-04-10 RX ADMIN — PANTOPRAZOLE SODIUM 40 MG: 40 TABLET, DELAYED RELEASE ORAL at 08:37

## 2020-04-10 NOTE — PROGRESS NOTES
"   LOS: 3 days   Patient Care Team:  Jewels Villela DO as PCP - General (Obstetrics and Gynecology)    Chief Complaint: Patient complains she just \"feels down\".  She rates her depression at 8 but does not describe any other symptoms, she also admits that she is feeling \"hopeless\" she says she is still hoping that her  at [facility] in [facility] can help her with housing.  She has talked to her about a program called \"[facility]\" a program that can help find housing.  She denies SI,  HI, hallucinations, or paranoia.  She is oriented x3.  She is eating well and has no other complaints      Interval History:           Vital Signs    Temp:  [98 °F (36.7 °C)-99.2 °F (37.3 °C)] 99.2 °F (37.3 °C)  Heart Rate:  [70-80] 80  Resp:  [18] 18  BP: (128-158)/(67-76) 128/67    Lab Results:   Lab Results (last 24 hours)     ** No results found for the last 24 hours. **           Labs:     Lab Results (last 24 hours)     ** No results found for the last 24 hours. **                        Exam:  Mental Status Exam:     Hygiene:   fair  Cooperation:  Cooperative  Eye Contact:  Good  Psychomotor Behavior:  Appropriate  Affect:  Appropriate  Speech:  Normal  Thought Progress:  Goal directed  Thought Content:  Mood congruent  Suicidal:  None  Homicidal:  None  Hallucinations:  None  Delusion:  None  Memory:  Intact  Orientation:  Person, Place and Time  Reliability:  fair  Insight:  Fair  Judgement:  Fair  Impulse Control:  Fair    Results Review:    Lab Results (last 24 hours)     ** No results found for the last 24 hours. **          Medication Review:    Current Facility-Administered Medications:   •  aluminum-magnesium hydroxide-simethicone (MAALOX MAX) 400-400-40 MG/5ML suspension 15 mL, 15 mL, Oral, Q6H PRN, Saroj Law MD  •  benzonatate (TESSALON) capsule 100 mg, 100 mg, Oral, TID PRN, Saroj Law MD  •  benztropine (COGENTIN) tablet 2 mg, 2 mg, Oral, Once PRN **OR** benztropine (COGENTIN) injection 1 " mg, 1 mg, Intramuscular, Once PRN, Saroj Law MD  •  busPIRone (BUSPAR) tablet 5 mg, 5 mg, Oral, BID, Saroj Law MD, 5 mg at 04/10/20 0837  •  famotidine (PEPCID) tablet 20 mg, 20 mg, Oral, BID PRN, Saroj Law MD  •  hydrOXYzine (ATARAX) tablet 50 mg, 50 mg, Oral, Q6H PRN, Saroj Law MD  •  ibuprofen (ADVIL,MOTRIN) tablet 400 mg, 400 mg, Oral, Q6H PRN, Saroj Law MD, 400 mg at 04/09/20 2032  •  loperamide (IMODIUM) capsule 2 mg, 2 mg, Oral, Q2H PRN, Saroj Law MD, 2 mg at 04/08/20 2054  •  magnesium hydroxide (MILK OF MAGNESIA) suspension 2400 mg/10mL 10 mL, 10 mL, Oral, Daily PRN, Saroj Law MD  •  ondansetron (ZOFRAN) tablet 4 mg, 4 mg, Oral, Q6H PRN, Saroj Law MD, 4 mg at 04/08/20 0840  •  pantoprazole (PROTONIX) EC tablet 40 mg, 40 mg, Oral, Daily, Saroj Law MD, 40 mg at 04/10/20 0837  •  sertraline (ZOLOFT) tablet 50 mg, 50 mg, Oral, Daily, Saroj Law MD, 50 mg at 04/10/20 0837  •  sodium chloride nasal spray 2 spray, 2 spray, Each Nare, PRN, Saroj Law MD  •  traZODone (DESYREL) tablet 50 mg, 50 mg, Oral, Nightly PRN, Saroj Law MD, 50 mg at 04/09/20 2300     Special Precautions Level: III        Assessment/Plan     Assessment: Assessment: Depression NOS  Zoloft 50 mg daily    Anxiety NOS  BuSpar 5 mg 3 times daily  PRN Vistaril            Treatment Plan discussed with: Therapist.  She will try to talk with  at [facility] in [place] and explore more about [facility] program    I have reviewed and approved the behavioral health treatment plans and problem list. Yes      Onelia Valladares MD  04/10/20  10:44

## 2020-04-10 NOTE — PLAN OF CARE
Problem: Patient Care Overview  Goal: Interprofessional Rounds/Family Conf  Flowsheets (Taken 4/10/2020 1130)  Participants: milieu/psych techs; nursing; social work; psychiatrist; other (see comments) (Navigator)  Summary: Staffed with Dr. Valladares, JJ Dominguez, and Navigator.     Problem: Overarching Goals (Adult)  Goal: Optimized Coping Skills in Response to Life Stressors  Intervention: Promote Effective Coping Strategies  Flowsheets (Taken 4/10/2020 1131)  Supportive Measures: active listening utilized; counseling provided; problem solving facilitated; self-reflection promoted; self-responsibility promoted     Data:     Therapist met with patient for individual session.  Staffed with Dr. Valladares, JJ Dominguez, and Navigator.  Continued to discuss progress and treatment goals.  Educated patient about importance of safety and aftercare plans.    Patient discussed feeling worried and depressed today.  She discussed stressors of having not support system, being homeless, and lacking income.  Therapist provided emotional support and reviewed mindfulness techniques with patient to assist with alleviating anxiety; patient receptive.  She discussed plan to attend [facility] upon discharge.  Therapist reviewed that [facility] will have open bed on Monday or Tuesday, patient agreeable.      Therapist sent ACT Team referral today per patient's request.  Therapist spoke with Rosemary, patient's assigned , at [facility] who stated that patient had been accepted to [facility] shelter after she discharged from [facility] recently, however patient refused to go.  She reported patient instead went to live with a cousin and can no longer live there.  Rosemary reported that patient is not eligible for TANF (Temporary Assistance to Needy Families) benefits because patient does not have any minor children.  Patient does receive food stamps.      Therapist staffed with lead therapist Lolis and Director Tom mckenzie  patient's case.  Patient recommended to attend [facility] as she is not appropriate and not eligible for substance abuse rehab (not currently in active addiction) or personal care (does not have any income), that homeless shelters are not accepting new referrals due to COVID-19, and that patient has exhausted all family supports.  Treatment team reports patient can be assisted with gas card upon discharge.      Assessment:    Patient is observed to display restricted affect and depressed mood.  Patient denied SI, HI, and AVH.  She appeared oriented x3 with limited insight.  She reported poor sleep and fair appetite.    Plan:    Therapist will continue to assist daily with aftercare and safety planning as needed.  Patient to attend [facility] upon discharge.  Patient has private transportation.

## 2020-04-10 NOTE — PLAN OF CARE
Problem: Patient Care Overview  Goal: Plan of Care Review  Outcome: Ongoing (interventions implemented as appropriate)  Flowsheets  Taken 4/8/2020 1001 by Roselyn Hudson  Consent Given to Review Plan with: KODY Moore  Outcome Summary: Completed initial assessment, discussed alternative aftercare resources and expectations of treatment; reviewed treatment plan.  Taken 4/9/2020 0429 by Ann Marie Reyna, RN  Progress: no change  Taken 4/10/2020 1024 by Alberto Montenegro, RN  Plan of Care Reviewed With: patient  Patient Agreement with Plan of Care: agrees

## 2020-04-10 NOTE — PLAN OF CARE
Problem: Patient Care Overview  Goal: Plan of Care Review  Outcome: Ongoing (interventions implemented as appropriate)  Flowsheets (Taken 4/10/2020 4226)  Plan of Care Reviewed With: patient  Patient Agreement with Plan of Care: agrees  Note:   Pt rates anxiety 5/10, depression 3/10. Denies, SI, HI or AVH. Appetite is good, has slept well throughout this night.

## 2020-04-11 PROCEDURE — 99232 SBSQ HOSP IP/OBS MODERATE 35: CPT | Performed by: PSYCHIATRY & NEUROLOGY

## 2020-04-11 RX ADMIN — BUSPIRONE HYDROCHLORIDE 5 MG: 5 TABLET ORAL at 20:42

## 2020-04-11 RX ADMIN — PANTOPRAZOLE SODIUM 40 MG: 40 TABLET, DELAYED RELEASE ORAL at 08:18

## 2020-04-11 RX ADMIN — IBUPROFEN 400 MG: 400 TABLET, FILM COATED ORAL at 22:10

## 2020-04-11 RX ADMIN — SERTRALINE 50 MG: 50 TABLET, FILM COATED ORAL at 08:18

## 2020-04-11 RX ADMIN — BUSPIRONE HYDROCHLORIDE 5 MG: 5 TABLET ORAL at 08:18

## 2020-04-11 NOTE — PLAN OF CARE
PT RATES ANXIETY 3/10 AND DEPRESSION 5/10. DENIES ANY SI, HI, OR AVH. PT REPORTS SHE FEELS HER MOOD IS IMPROVING. PT SPENT THE MAJORITY OF THE SHIFT RESTING.

## 2020-04-11 NOTE — PROGRESS NOTES
"INPATIENT PSYCHIATRIC PROGRESS NOTE    Name:  Tigist Bates  :  1960  MRN:  [id number]  Visit Number:  [id number]  Length of stay:  4    SUBJECTIVE  CC/Focus of Exam: depression    INTERVAL HISTORY:  The patient reports she is feeling better but continues to feel depressed and has suicidal thoughts but no plans to hurt herself.  Depression rating 10/10  Anxiety rating 7/10  Sleep: good  Withdrawal sx: denies  Cravin/10    Review of Systems   Respiratory: Positive for shortness of breath.    Cardiovascular: Negative.        OBJECTIVE    Temp:  [98 °F (36.7 °C)] 98 °F (36.7 °C)  Heart Rate:  [72] 72  Resp:  [18] 18  BP: (137-155)/(83) 137/83    MENTAL STATUS EXAM:  Appearance:Casually dressed, good hygeine.   Cooperation:Cooperative  Psychomotor: No psychomotor agitation/retardation, No EPS, No motor tics  Speech-normal rate, amount.  Mood \"depressed\"   Affect-congruent, appropriate, stable  Thought Content-goal directed, no delusional material present  Thought process-linear, organized.  Suicidality: + SI, no plan  Homicidality: No HI  Perception: No AH/VH  Insight-fair   Judgement-fair    Lab Results (last 24 hours)     ** No results found for the last 24 hours. **             Imaging Results (Last 24 Hours)     ** No results found for the last 24 hours. **             ECG/EMG Results (most recent)     Procedure Component Value Units Date/Time    ECG 12 Lead [[id number]] Collected:  20     Updated:  20 924    Narrative:       Test Reason : Baseline Cardiac Status  Blood Pressure : **/** mmHG  Vent. Rate : 067 BPM     Atrial Rate : 067 BPM     P-R Int : 126 ms          QRS Dur : 090 ms      QT Int : 432 ms       P-R-T Axes : -14 001 029 degrees     QTc Int : 456 ms    Normal sinus rhythm  Nonspecific T wave abnormality  Abnormal ECG  When compared with ECG of 24-MAR-2020 01:01,  No significant change was found  Confirmed by Solitario Wing () on 2020 5:46:51 PM    Referred " By:  NAZARIO           Confirmed By:Solitario Subramaniyam           ALLERGIES: Patient has no known allergies.      Current Facility-Administered Medications:   •  aluminum-magnesium hydroxide-simethicone (MAALOX MAX) 400-400-40 MG/5ML suspension 15 mL, 15 mL, Oral, Q6H PRN, Saroj Law MD  •  benzonatate (TESSALON) capsule 100 mg, 100 mg, Oral, TID PRN, Saroj Law MD  •  benztropine (COGENTIN) tablet 2 mg, 2 mg, Oral, Once PRN **OR** benztropine (COGENTIN) injection 1 mg, 1 mg, Intramuscular, Once PRN, Saroj Law MD  •  busPIRone (BUSPAR) tablet 5 mg, 5 mg, Oral, BID, Saroj Law MD, 5 mg at 04/11/20 0818  •  famotidine (PEPCID) tablet 20 mg, 20 mg, Oral, BID PRN, Saroj Law MD  •  hydrOXYzine (ATARAX) tablet 50 mg, 50 mg, Oral, Q6H PRN, Saroj Law MD  •  ibuprofen (ADVIL,MOTRIN) tablet 400 mg, 400 mg, Oral, Q6H PRN, Saroj Law MD, 400 mg at 04/10/20 1522  •  loperamide (IMODIUM) capsule 2 mg, 2 mg, Oral, Q2H PRN, Saroj Law MD, 2 mg at 04/08/20 2054  •  magnesium hydroxide (MILK OF MAGNESIA) suspension 2400 mg/10mL 10 mL, 10 mL, Oral, Daily PRN, Saroj Law MD  •  ondansetron (ZOFRAN) tablet 4 mg, 4 mg, Oral, Q6H PRN, Saroj Law MD, 4 mg at 04/10/20 2201  •  pantoprazole (PROTONIX) EC tablet 40 mg, 40 mg, Oral, Daily, Saroj Law MD, 40 mg at 04/11/20 0818  •  sertraline (ZOLOFT) tablet 50 mg, 50 mg, Oral, Daily, Saroj Law MD, 50 mg at 04/11/20 0818  •  sodium chloride nasal spray 2 spray, 2 spray, Each Nare, PRN, Saroj Law MD  •  traZODone (DESYREL) tablet 50 mg, 50 mg, Oral, Nightly PRN, Saroj Law MD, 50 mg at 04/09/20 2300    ASSESSMENT & PLAN:    Depression NOS  Zoloft 50 mg daily     Anxiety NOS  BuSpar 5 mg 3 times daily  PRN Vistaril    Special precautions: Special Precautions Level 3 (q15 min checks) .    Behavioral Health Treatment Plan and Problem List: I have reviewed and approved the Behavioral Health Treatment Plan and Problem list.  The patient has had  a chance to review and agrees with the treatment plan.     Clinician:  Saroj Law MD  04/11/20  12:30

## 2020-04-11 NOTE — PLAN OF CARE
Problem: Patient Care Overview  Goal: Plan of Care Review  Outcome: Ongoing (interventions implemented as appropriate)  Flowsheets  Taken 4/11/2020 0530  Progress: no change  Outcome Summary: Pt rates anxiety 5/10 depression 3/10. Denies SI HI AVH.  Taken 4/10/2020 2112  Plan of Care Reviewed With: patient  Patient Agreement with Plan of Care: agrees

## 2020-04-12 PROCEDURE — 99232 SBSQ HOSP IP/OBS MODERATE 35: CPT | Performed by: PSYCHIATRY & NEUROLOGY

## 2020-04-12 RX ADMIN — PANTOPRAZOLE SODIUM 40 MG: 40 TABLET, DELAYED RELEASE ORAL at 09:16

## 2020-04-12 RX ADMIN — SERTRALINE 50 MG: 50 TABLET, FILM COATED ORAL at 09:16

## 2020-04-12 RX ADMIN — BUSPIRONE HYDROCHLORIDE 5 MG: 5 TABLET ORAL at 20:22

## 2020-04-12 RX ADMIN — BUSPIRONE HYDROCHLORIDE 5 MG: 5 TABLET ORAL at 09:16

## 2020-04-12 NOTE — PLAN OF CARE
Problem: Patient Care Overview  Goal: Plan of Care Review  Outcome: Ongoing (interventions implemented as appropriate)  Flowsheets  Taken 4/12/2020 0441  Progress: no change  Outcome Summary: Pt tearful during assessment. Rates anxiety 5/10 depression 3/10. Reports SI- no plan, denies HI AVH.  Taken 4/11/2020 4797  Plan of Care Reviewed With: patient  Patient Agreement with Plan of Care: agrees

## 2020-04-12 NOTE — PROGRESS NOTES
"INPATIENT PSYCHIATRIC PROGRESS NOTE    Name:  Tigist Bates  :  1960  MRN:  [id number]  Visit Number:  [id number]  Length of stay:  5    SUBJECTIVE  CC/Focus of Exam: depression    INTERVAL HISTORY:  The patient reports she is feeling some better but is feeling tired and lazy. She denies any suicidal thoughts today and when asked what has helped she replied \"I don't know, just forgot about it, I guess\".  Depression rating 9/10  Anxiety rating 6/10  Sleep: good  Withdrawal sx: denies  Cravin/10    Review of Systems   Respiratory: Positive for shortness of breath.    Cardiovascular: Negative.        OBJECTIVE    Temp:  [97.7 °F (36.5 °C)-98.4 °F (36.9 °C)] 97.7 °F (36.5 °C)  Heart Rate:  [74-87] 74  Resp:  [18] 18  BP: (139-166)/(80-88) 139/80    MENTAL STATUS EXAM:  Appearance:Casually dressed, good hygeine.   Cooperation:Cooperative  Psychomotor: No psychomotor agitation/retardation, No EPS, No motor tics  Speech-normal rate, amount.  Mood \"depressed\"   Affect-congruent, appropriate, stable  Thought Content-goal directed, no delusional material present  Thought process-linear, organized.  Suicidality: No SI  Homicidality: No HI  Perception: No AH/VH  Insight-fair   Judgement-fair    Lab Results (last 24 hours)     ** No results found for the last 24 hours. **             Imaging Results (Last 24 Hours)     ** No results found for the last 24 hours. **             ECG/EMG Results (most recent)     Procedure Component Value Units Date/Time    ECG 12 Lead [[id number]] Collected:  20     Updated:  20    Narrative:       Test Reason : Baseline Cardiac Status  Blood Pressure : **/** mmHG  Vent. Rate : 067 BPM     Atrial Rate : 067 BPM     P-R Int : 126 ms          QRS Dur : 090 ms      QT Int : 432 ms       P-R-T Axes : -14 001 029 degrees     QTc Int : 456 ms    Normal sinus rhythm  Nonspecific T wave abnormality  Abnormal ECG  When compared with ECG of 24-MAR-2020 01:01,  No " significant change was found  Confirmed by Solitario Wing (2020) on 4/8/2020 5:46:51 PM    Referred By:  NAZARIO           Confirmed By:Solitario Wing           ALLERGIES: Patient has no known allergies.      Current Facility-Administered Medications:   •  aluminum-magnesium hydroxide-simethicone (MAALOX MAX) 400-400-40 MG/5ML suspension 15 mL, 15 mL, Oral, Q6H PRN, Saroj Law MD  •  benzonatate (TESSALON) capsule 100 mg, 100 mg, Oral, TID PRN, Saroj Law MD  •  benztropine (COGENTIN) tablet 2 mg, 2 mg, Oral, Once PRN **OR** benztropine (COGENTIN) injection 1 mg, 1 mg, Intramuscular, Once PRN, Saroj Law MD  •  busPIRone (BUSPAR) tablet 5 mg, 5 mg, Oral, BID, Saroj Law MD, 5 mg at 04/12/20 0916  •  famotidine (PEPCID) tablet 20 mg, 20 mg, Oral, BID PRN, Saroj Law MD  •  hydrOXYzine (ATARAX) tablet 50 mg, 50 mg, Oral, Q6H PRN, Saroj Law MD  •  ibuprofen (ADVIL,MOTRIN) tablet 400 mg, 400 mg, Oral, Q6H PRN, Saroj Law MD, 400 mg at 04/11/20 2210  •  loperamide (IMODIUM) capsule 2 mg, 2 mg, Oral, Q2H PRN, Saroj Law MD, 2 mg at 04/08/20 2054  •  magnesium hydroxide (MILK OF MAGNESIA) suspension 2400 mg/10mL 10 mL, 10 mL, Oral, Daily PRN, Saroj Law MD  •  ondansetron (ZOFRAN) tablet 4 mg, 4 mg, Oral, Q6H PRN, Saroj Law MD, 4 mg at 04/10/20 2201  •  pantoprazole (PROTONIX) EC tablet 40 mg, 40 mg, Oral, Daily, Saroj Law MD, 40 mg at 04/12/20 0916  •  sertraline (ZOLOFT) tablet 50 mg, 50 mg, Oral, Daily, Saroj Law MD, 50 mg at 04/12/20 0916  •  sodium chloride nasal spray 2 spray, 2 spray, Each Nare, PRN, Saroj Law MD    ASSESSMENT & PLAN:    Depression NOS  Zoloft 50 mg daily     Anxiety NOS  BuSpar 5 mg 3 times daily  PRN Vistaril    Special precautions: Special Precautions Level 3 (q15 min checks) .    Behavioral Health Treatment Plan and Problem List: I have reviewed and approved the Behavioral Health Treatment Plan and Problem list.  The patient has had a  chance to review and agrees with the treatment plan.     Clinician:  Saroj Law MD  04/12/20  12:38

## 2020-04-13 VITALS
OXYGEN SATURATION: 97 % | TEMPERATURE: 98.2 F | BODY MASS INDEX: 27.51 KG/M2 | SYSTOLIC BLOOD PRESSURE: 161 MMHG | WEIGHT: 171.2 LBS | HEIGHT: 66 IN | RESPIRATION RATE: 18 BRPM | HEART RATE: 83 BPM | DIASTOLIC BLOOD PRESSURE: 81 MMHG

## 2020-04-13 RX ORDER — BUSPIRONE HYDROCHLORIDE 5 MG/1
5 TABLET ORAL 3 TIMES DAILY
Qty: 45 TABLET | Refills: 0 | Status: SHIPPED | OUTPATIENT
Start: 2020-04-13 | End: 2020-04-28

## 2020-04-13 RX ORDER — TRAZODONE HYDROCHLORIDE 50 MG/1
50 TABLET ORAL NIGHTLY PRN
Qty: 15 TABLET | Refills: 0 | Status: SHIPPED | OUTPATIENT
Start: 2020-04-13 | End: 2020-04-28

## 2020-04-13 RX ADMIN — BUSPIRONE HYDROCHLORIDE 5 MG: 5 TABLET ORAL at 09:02

## 2020-04-13 RX ADMIN — PANTOPRAZOLE SODIUM 40 MG: 40 TABLET, DELAYED RELEASE ORAL at 09:02

## 2020-04-13 RX ADMIN — SERTRALINE 50 MG: 50 TABLET, FILM COATED ORAL at 09:02

## 2020-04-13 NOTE — PLAN OF CARE
Problem: Patient Care Overview  Goal: Plan of Care Review  Outcome: Ongoing (interventions implemented as appropriate)  Flowsheets (Taken 4/13/2020 2146)  Progress: no change  Plan of Care Reviewed With: patient  Patient Agreement with Plan of Care: agrees  Outcome Summary: Pt rates anxiety 8/10 ;depression 3/10. Denies SI or AVH. Isolates most of shift. Has slept well.

## 2020-04-13 NOTE — PLAN OF CARE
Problem: Patient Care Overview  Goal: Interprofessional Rounds/Family Conf  4/13/2020 0955 by Roselyn Hudson  Outcome: Outcome(s) achieved  Flowsheets (Taken 4/13/2020 0955)  Participants: social work; milieu/psych techs; nursing; psychiatrist  Summary: Treatment team staffing with JJ Dias, Director Tom.     Therapist staffed with treatment and reviewed patient's chart.  Met with patient for individual to address concerns and reviewed disposition/aftercare. Therapist spoke with [facility] staff Meryl via phone who stated they are no longer taking people who are homeless due to the shelters currently not accepting new people. Therapist spoke with ACT Team  Viktoria who stated patient's referral is still in review with their treatment team but that patient will called to schedule intake appointment.  Therapist staffed with Director Tom Regan.  Therapist strongly encouraged patient to contact any other family members or friends to assist with disposition planning.  Patient refused and stated she will not go back to her cousin's home.  Therapist has spoke with patient's sister Ariela who refuses to assist or help patient due to patient's history of substance abuse and stealing from family.  Patient stated she feels okay today and is wanting to discharge.  She stated she is able to park her car at [place] as needed.  Patient denied SI, HI, and AVH.  She reported feeling somewhat hopeful with improved sleep.  Patient to discharge today.  She has aftercare scheduled with KODY Moore on 04/16/20.  She denied transportation needs.

## 2020-04-13 NOTE — PLAN OF CARE
Patient rates anxiety 4 and depression 5-6, denies thoughts to harm self and others, and denies hallucinations.

## 2020-04-13 NOTE — PROGRESS NOTES
"   LOS: 6 days   Patient Care Team:  Jewels Villela, DO as PCP - General (Obstetrics and Gynecology)    Chief Complaint: Patient says \"I am okay\" she says she is doing better, she is not crying, she thinks medication has helped, she rates her depression at 3 or 4, recent anxiety at 5 or 6.  Scribe symptoms as \"want to get out of here but have no plan\". she says she intends to live in her car      Interval History: I have talked with [facility], [facility] because she has no plan after her stay there.  Therapist will also talk with  Rosemary at [facility] in [facility] who has told that patient does not qualify for [place]\" because she does not have children.   has suggested her getting a job and her trying to obtain a low income apartment for her, But patient says she cannot work has not worked any since 2016.            Vital Signs    Temp:  [97.5 °F (36.4 °C)-98.2 °F (36.8 °C)] 98.2 °F (36.8 °C)  Heart Rate:  [71-83] 83  Resp:  [18] 18  BP: (161-167)/(81) 161/81    Lab Results:   Lab Results (last 24 hours)     ** No results found for the last 24 hours. **           Labs:     Lab Results (last 24 hours)     ** No results found for the last 24 hours. **                        Exam:  Mental Status Exam:     Hygiene:   fair  Cooperation:  Cooperative  Eye Contact:  Good  Psychomotor Behavior:  Appropriate  Affect:  Appropriate  Speech:  Normal  Thought Progress:  Goal directed  Thought Content:  Mood congruent  Suicidal:  None  Homicidal:  None  Hallucinations:  None  Delusion:  None  Memory:  Intact  Orientation:  Person, Place and Time  Reliability:  fair  Insight:  Fair  Judgement:  Fair  Impulse Control:  Fair    Results Review:    Lab Results (last 24 hours)     ** No results found for the last 24 hours. **          Medication Review:    Current Facility-Administered Medications:   •  aluminum-magnesium hydroxide-simethicone (MAALOX MAX) 400-400-40 " MG/5ML suspension 15 mL, 15 mL, Oral, Q6H PRN, Saroj Law MD  •  benzonatate (TESSALON) capsule 100 mg, 100 mg, Oral, TID PRN, Saroj Law MD  •  benztropine (COGENTIN) tablet 2 mg, 2 mg, Oral, Once PRN **OR** benztropine (COGENTIN) injection 1 mg, 1 mg, Intramuscular, Once PRN, Saroj Law MD  •  busPIRone (BUSPAR) tablet 5 mg, 5 mg, Oral, BID, Saroj Law MD, 5 mg at 04/13/20 0902  •  famotidine (PEPCID) tablet 20 mg, 20 mg, Oral, BID PRN, Saroj Law MD  •  hydrOXYzine (ATARAX) tablet 50 mg, 50 mg, Oral, Q6H PRN, Saroj Law MD  •  ibuprofen (ADVIL,MOTRIN) tablet 400 mg, 400 mg, Oral, Q6H PRN, Saroj Law MD, 400 mg at 04/11/20 2210  •  loperamide (IMODIUM) capsule 2 mg, 2 mg, Oral, Q2H PRN, Saroj Law MD, 2 mg at 04/08/20 2054  •  magnesium hydroxide (MILK OF MAGNESIA) suspension 2400 mg/10mL 10 mL, 10 mL, Oral, Daily PRN, Saroj Law MD  •  ondansetron (ZOFRAN) tablet 4 mg, 4 mg, Oral, Q6H PRN, Saroj Law MD, 4 mg at 04/10/20 2201  •  pantoprazole (PROTONIX) EC tablet 40 mg, 40 mg, Oral, Daily, Saroj Law MD, 40 mg at 04/13/20 0902  •  sertraline (ZOLOFT) tablet 50 mg, 50 mg, Oral, Daily, Saroj Law MD, 50 mg at 04/13/20 0902  •  sodium chloride nasal spray 2 spray, 2 spray, Each Nare, PRN, Saroj Law MD     Special Precautions Level: III    Assessment/Plan     Assessment: Depression NOS  Zoloft 50 mg daily  Anxiety NOS  BuSpar 5 mg 3 times daily and as needed Vistaril      Treatment Plan: Have carefully discussed plans with patient, therapist and also Tom.  Patient says she wants to leave today.  She also has a check in the car and her car that she needs to mail today.  She intends to live in her car, she also will see her ACT  at [facility] who will assist her in obtaining housing.  I have talked with therapist Roselyn again, she will contact  from ACT team at [facility] in [place] and schedule her an appointment      I have reviewed and approved  the behavioral health treatment plans and problem list. Yes      Onelia Valladares MD  04/13/20  09:35

## 2020-04-14 NOTE — PROGRESS NOTES
Behavioral Health Discharge Summary             Please fax within 24 hours of discharge to [facility] at: [phone]      Member Name: Tigist Bates Member ID: [id number]   Authorization Number: [id number] Phone: [phone]   Member Address: [address] [place] [address]   Discharge Date: 04/13/2020 Level of Care at Discharge:    Facility: [facility] Staff Completing Form: Enedina MONIQUE RN   If the member is being discharged directly to a residential or extended care program, please specify the type below.   __Private Child-Caring Facility (PCC) Residential/Group Home   __Private Child-Caring Facility (PCC) Therapeutic Foster Care   __Residential Treatment Facility (RTF)   __Psychiatric Residential Treatment Facility (PRTF I or II)   __Long-Term Acute Inpatient Hospital Services or Extended Care Unit (ECU)   __Other (please specify):    Brief discharge summary of treatment received (for follow up by the case management team): D/C clinical with list of medications and follow up appts given to patient upon discharge.     BRIEF SUMMARY OF RECOMMENDATIONS FOR ONGOING TREATMENT     Discharged to where: Homeless    Discharge diagnose/s:    Axis I:    Axis II:    Axis III:    Axis IV:    Axis V:    Does the member understand his/her DX?  Yes          Medication     Dose     Schedule Supply/  Quantity  Given at Discharge RX Provided  Yes/No  If Rx Provided, Quantity RX Prior Auth Required  Yes/No Prior Auth  Completed   buspar  5 mg  tid                                                                                        Does the member understand the reason for taking these medications? Yes                                                           FOLLOW-UP APPOINTMENTS   Please schedule within 7 days of discharge and provide appointment details for all referred services.    PCP/Other Providers Involved in Treatment:    Appointment Type:   JITENDRA TAVERAS  Provider Name:  [facility]  Provider Phone:  [phone]  Appointment Date: 04/16/2020 Appointment Time: 11:00 AM with Amanda     Assessment   (new to OP services)        Case Management    Is the member already enrolled in case management?  Yes/No  If yes, date the CM was notified:    If no, was the CM referral offered?  Yes/No  Accepted? Yes/No    Is the Release of Information in the chart? Yes/No:      Medication Management (for member discharged with psychiatric medications):      A&D Treatment (for member with substance abuse/   dependence in the past year):      Medical Condition (for member with a medical condition):    Other recommended treatment:    Do you have any concerns about the discharge plan?  No    If yes, explain:    Was the member involved in the discharge planning?  Yes    If no, explain:    Was a copy of the discharge plan provided to the member?  Yes    If no, explain:

## 2020-04-16 NOTE — DISCHARGE SUMMARY
Date of Discharge:  4/16/2020    Presenting Problem/History of Present Illness  Depression with suicidal ideation [F32.9, R46.851]     Hospital Course  Patient was hospitalized on  4/7 /2020 aftershe presented to ER  Reporting  Suicidal thoughts with plans to use a firearm and also reported visual  hallucination, saw a  when no one was there.said she is homeless &living in her car .She was placed on sp3  zoloft 50mg daiy buspar 5mgtid.isaw her on 4 /8 /20 ,did initial H&P ,noted that she denied si ,hi hallu or paranoia.She was well oriented ,memory was intact .she wasreferredto therapistto assist with discharge disposition.She  was seen by on call,physicianjohanna gonzalez of 4/11/20 &4/12/20,noted to be stable.I resumed pt care on 4/13 /20.shereported doing well denied si hi hallu or paranoia &was reuesting discharge.shewas not able to get in homeless shelterbecause they were not accepting,pts due to coronavirus situation,,[facility] [place] [place] [facility].pt was agreeable &assured safety.She was discharged on 4/13/20.                    .&&  Procedures Performed         Consults:   Consults     No orders found from 3/9/2020 to 4/8/2020.          Pertinent Test Results:         Discharge Disposition  Home or Self Care    Discharge Medications     Your medication list      CHANGE how you take these medications      Instructions Last Dose Given Next Dose Due   busPIRone 5 MG tablet  Commonly known as:  BUSPAR  What changed:  when to take this      Take 1 tablet by mouth 3 (Three) Times a Day for 15 days. Indications: Anxiety Disorder          CONTINUE taking these medications      Instructions Last Dose Given Next Dose Due   pantoprazole 40 MG EC tablet  Commonly known as:  PROTONIX      Take 40 mg by mouth Daily.       sertraline 50 MG tablet  Commonly known as:  ZOLOFT      Take 1 tablet by mouth Daily for 30 days. Indications: Major Depressive Disorder       traZODone 50 MG  tablet  Commonly known as:  DESYREL      Take 1 tablet by mouth At Night As Needed for Sleep for up to 15 days. Indications: Trouble Sleeping             Where to Get Your Medications      These medications were sent to [facility] [address], [place] [address]    Hours:  8AM-6PM Mon-Fri Phone:  [phone]   · busPIRone 5 MG tablet  · traZODone 50 MG tablet         Lab Results (last 24 hours)     ** No results found for the last 24 hours. **        Follow-up Appointments  No future appointments.      Discharge Diagnosis:           Onelia Valladares MD  04/16/20  12:24

## 2021-06-29 ENCOUNTER — HOSPITAL ENCOUNTER (OUTPATIENT)
Dept: ULTRASOUND IMAGING | Facility: HOSPITAL | Age: 61
Discharge: HOME | End: 2021-06-29

## 2021-06-29 ENCOUNTER — HOSPITAL ENCOUNTER (OUTPATIENT)
Dept: BONE DENSITY | Facility: HOSPITAL | Age: 61
Discharge: HOME | End: 2021-06-29

## 2021-06-29 ENCOUNTER — HOSPITAL ENCOUNTER (OUTPATIENT)
Dept: MAMMOGRAPHY | Facility: HOSPITAL | Age: 61
Discharge: HOME | End: 2021-06-29

## 2021-06-29 DIAGNOSIS — N63.0 LUMP OR MASS IN BREAST: ICD-10-CM

## 2021-06-29 DIAGNOSIS — R92.8 ABNORMAL MAMMOGRAM: ICD-10-CM

## 2021-06-29 DIAGNOSIS — Z13.820 SCREENING FOR OSTEOPOROSIS: ICD-10-CM

## 2021-06-29 PROCEDURE — 77062 BREAST TOMOSYNTHESIS BI: CPT | Performed by: RADIOLOGY

## 2021-06-29 PROCEDURE — G0279 TOMOSYNTHESIS, MAMMO: HCPCS

## 2021-06-29 PROCEDURE — 76642 ULTRASOUND BREAST LIMITED: CPT

## 2021-06-29 PROCEDURE — 77066 DX MAMMO INCL CAD BI: CPT | Performed by: RADIOLOGY

## 2021-06-29 PROCEDURE — 77080 DXA BONE DENSITY AXIAL: CPT | Performed by: RADIOLOGY

## 2021-06-29 PROCEDURE — 77066 DX MAMMO INCL CAD BI: CPT

## 2021-06-29 PROCEDURE — 77080 DXA BONE DENSITY AXIAL: CPT

## 2021-06-29 PROCEDURE — 76642 ULTRASOUND BREAST LIMITED: CPT | Performed by: RADIOLOGY

## 2021-10-14 ENCOUNTER — HOSPITAL ENCOUNTER (OUTPATIENT)
Dept: HOSPITAL 79 - HEART CORB | Age: 61
End: 2021-10-14
Attending: INTERNAL MEDICINE
Payer: COMMERCIAL

## 2021-10-14 DIAGNOSIS — R07.2: Primary | ICD-10-CM

## 2022-04-08 ENCOUNTER — HOSPITAL ENCOUNTER (EMERGENCY)
Facility: HOSPITAL | Age: 62
Discharge: HOME | End: 2022-04-08
Attending: STUDENT IN AN ORGANIZED HEALTH CARE EDUCATION/TRAINING PROGRAM | Admitting: STUDENT IN AN ORGANIZED HEALTH CARE EDUCATION/TRAINING PROGRAM

## 2022-04-08 ENCOUNTER — APPOINTMENT (OUTPATIENT)
Dept: GENERAL RADIOLOGY | Facility: HOSPITAL | Age: 62
End: 2022-04-08

## 2022-04-08 VITALS
DIASTOLIC BLOOD PRESSURE: 88 MMHG | WEIGHT: 176 LBS | TEMPERATURE: 97.9 F | BODY MASS INDEX: 28.28 KG/M2 | HEART RATE: 78 BPM | SYSTOLIC BLOOD PRESSURE: 138 MMHG | OXYGEN SATURATION: 99 % | RESPIRATION RATE: 15 BRPM | HEIGHT: 66 IN

## 2022-04-08 DIAGNOSIS — K59.00 CONSTIPATION, UNSPECIFIED CONSTIPATION TYPE: Primary | ICD-10-CM

## 2022-04-08 LAB
ALBUMIN SERPL-MCNC: 4.52 G/DL (ref 3.5–5.2)
ALBUMIN/GLOB SERPL: 1.3 G/DL
ALP SERPL-CCNC: 106 U/L (ref 39–117)
ALT SERPL W P-5'-P-CCNC: 38 U/L (ref 1–33)
ANION GAP SERPL CALCULATED.3IONS-SCNC: 9.4 MMOL/L (ref 5–15)
AST SERPL-CCNC: 26 U/L (ref 1–32)
BASOPHILS # BLD AUTO: 0.05 10*3/MM3 (ref 0–0.2)
BASOPHILS NFR BLD AUTO: 0.7 % (ref 0–1.5)
BILIRUB SERPL-MCNC: 0.6 MG/DL (ref 0–1.2)
BUN SERPL-MCNC: 10 MG/DL (ref 8–23)
BUN/CREAT SERPL: 11.6 (ref 7–25)
CALCIUM SPEC-SCNC: 9.9 MG/DL (ref 8.6–10.5)
CHLORIDE SERPL-SCNC: 100 MMOL/L (ref 98–107)
CO2 SERPL-SCNC: 24.6 MMOL/L (ref 22–29)
CREAT SERPL-MCNC: 0.86 MG/DL (ref 0.57–1)
DEPRECATED RDW RBC AUTO: 44.6 FL (ref 37–54)
EGFRCR SERPLBLD CKD-EPI 2021: 77.5 ML/MIN/1.73
EOSINOPHIL # BLD AUTO: 0.16 10*3/MM3 (ref 0–0.4)
EOSINOPHIL NFR BLD AUTO: 2.4 % (ref 0.3–6.2)
ERYTHROCYTE [DISTWIDTH] IN BLOOD BY AUTOMATED COUNT: 13.2 % (ref 12.3–15.4)
GLOBULIN UR ELPH-MCNC: 3.5 GM/DL
GLUCOSE SERPL-MCNC: 102 MG/DL (ref 65–99)
HCT VFR BLD AUTO: 46.7 % (ref 34–46.6)
HGB BLD-MCNC: 15.2 G/DL (ref 12–15.9)
IMM GRANULOCYTES # BLD AUTO: 0.02 10*3/MM3 (ref 0–0.05)
IMM GRANULOCYTES NFR BLD AUTO: 0.3 % (ref 0–0.5)
LIPASE SERPL-CCNC: 38 U/L (ref 13–60)
LYMPHOCYTES # BLD AUTO: 1.96 10*3/MM3 (ref 0.7–3.1)
LYMPHOCYTES NFR BLD AUTO: 29 % (ref 19.6–45.3)
MAGNESIUM SERPL-MCNC: 2.3 MG/DL (ref 1.6–2.4)
MCH RBC QN AUTO: 29.6 PG (ref 26.6–33)
MCHC RBC AUTO-ENTMCNC: 32.5 G/DL (ref 31.5–35.7)
MCV RBC AUTO: 90.9 FL (ref 79–97)
MONOCYTES # BLD AUTO: 0.51 10*3/MM3 (ref 0.1–0.9)
MONOCYTES NFR BLD AUTO: 7.6 % (ref 5–12)
NEUTROPHILS NFR BLD AUTO: 4.05 10*3/MM3 (ref 1.7–7)
NEUTROPHILS NFR BLD AUTO: 60 % (ref 42.7–76)
NRBC BLD AUTO-RTO: 0 /100 WBC (ref 0–0.2)
PLATELET # BLD AUTO: 273 10*3/MM3 (ref 140–450)
PMV BLD AUTO: 9.4 FL (ref 6–12)
POTASSIUM SERPL-SCNC: 4.1 MMOL/L (ref 3.5–5.2)
PROT SERPL-MCNC: 8 G/DL (ref 6–8.5)
RBC # BLD AUTO: 5.14 10*6/MM3 (ref 3.77–5.28)
SODIUM SERPL-SCNC: 134 MMOL/L (ref 136–145)
WBC NRBC COR # BLD: 6.75 10*3/MM3 (ref 3.4–10.8)

## 2022-04-08 PROCEDURE — 36415 COLL VENOUS BLD VENIPUNCTURE: CPT

## 2022-04-08 PROCEDURE — 83735 ASSAY OF MAGNESIUM: CPT | Performed by: STUDENT IN AN ORGANIZED HEALTH CARE EDUCATION/TRAINING PROGRAM

## 2022-04-08 PROCEDURE — 74018 RADEX ABDOMEN 1 VIEW: CPT

## 2022-04-08 PROCEDURE — 74018 RADEX ABDOMEN 1 VIEW: CPT | Performed by: RADIOLOGY

## 2022-04-08 PROCEDURE — 99282 EMERGENCY DEPT VISIT SF MDM: CPT

## 2022-04-08 PROCEDURE — 80053 COMPREHEN METABOLIC PANEL: CPT | Performed by: STUDENT IN AN ORGANIZED HEALTH CARE EDUCATION/TRAINING PROGRAM

## 2022-04-08 PROCEDURE — 85025 COMPLETE CBC W/AUTO DIFF WBC: CPT | Performed by: STUDENT IN AN ORGANIZED HEALTH CARE EDUCATION/TRAINING PROGRAM

## 2022-04-08 PROCEDURE — 83690 ASSAY OF LIPASE: CPT | Performed by: STUDENT IN AN ORGANIZED HEALTH CARE EDUCATION/TRAINING PROGRAM

## 2022-04-08 NOTE — ED PROVIDER NOTES
[facility]  emergency department encounter        Pt Name: Tigist Bates  MRN: [id number]  Birthdate 1960  Date of evaluation: 4/8/2022    Chief Complaint   Patient presents with   • Abdominal Pain   • Constipation             HISTORY OF PRESENT ILLNESS:   Tigist Bates is a 61 y.o. female endorses past medical history significant for GERD, prediabetes, chronic constipation.    Patient presents for abdominal pain and constipation ongoing for 2 weeks.  Patient has nonradiating left lower quadrant abdominal ache she believes is secondary to constipation.  Pain is constant.  She has been taking milk of magnesia for the past 3-4 months and took 1 capful of MiraLAX today.  Endorses intermittent small-volume blood in stool, couple drops with stooling.  Endorses passing gas.  Patient endorses 2 months of mild cough congestion rhinorrhea, otherwise broadly denies ROS.    Patient has a GI specialist Dr. Bernal who she has not seen in 2 years but scheduled to see next Friday.    No other exacerbating or alleviating factors other than as noted above.  Severity: Severe    PCP: Sher Blackburn DO          REVIEW OF SYSTEMS:     Review of Systems   Constitutional: Negative for fever.   HENT: Positive for congestion and rhinorrhea.    Eyes: Negative for visual disturbance.   Respiratory: Positive for cough. Negative for shortness of breath.    Cardiovascular: Negative for chest pain.   Gastrointestinal: Positive for abdominal pain, blood in stool and constipation. Negative for vomiting.   Genitourinary: Negative for dysuria.   Musculoskeletal: Negative for myalgias.   Skin: Negative for rash.   Neurological: Negative for headaches.   Psychiatric/Behavioral: Negative for confusion.       Review of systems otherwise as per HPI.          PREVIOUS HISTORY:         Past Medical History:   Diagnosis Date   • Anxiety    • Chronic pain    • Depression    • GERD (gastroesophageal reflux disease)    • Suicidal  thoughts            Past Surgical History:   Procedure Laterality Date   • TONSILLECTOMY             Social History     Socioeconomic History   • Marital status: Single   Tobacco Use   • Smoking status: Former Smoker   • Smokeless tobacco: Never Used   Substance and Sexual Activity   • Alcohol use: Not Currently   • Drug use: Not Currently     Types: Benzodiazepines, Marijuana   • Sexual activity: Not Currently     Partners: Male           Family History   Problem Relation Age of Onset   • Depression Sister    • Breast cancer Neg Hx          Current Outpatient Medications   Medication Instructions   • pantoprazole (PROTONIX) 40 mg, Oral, Daily         Allergies:  Patient has no known allergies.    Medications, allergies and past medical, surgical, family, and social history reviewed.        PHYSICAL EXAM:     Physical Exam  Vitals and nursing note reviewed.   Constitutional:       General: She is not in acute distress.  HENT:      Head: Normocephalic and atraumatic.   Eyes:      Extraocular Movements: Extraocular movements intact.      Conjunctiva/sclera: Conjunctivae normal.   Cardiovascular:      Rate and Rhythm: Normal rate and regular rhythm.   Pulmonary:      Effort: Pulmonary effort is normal. No respiratory distress.   Abdominal:      General: Abdomen is flat. There is no distension.      Palpations: Abdomen is soft.      Tenderness: There is abdominal tenderness. There is no guarding or rebound.      Comments: Mild llq ttp   Musculoskeletal:         General: No deformity. Normal range of motion.      Cervical back: Normal range of motion. No rigidity.   Neurological:      General: No focal deficit present.      Mental Status: She is alert and oriented to person, place, and time.   Psychiatric:         Mood and Affect: Mood normal.         Behavior: Behavior normal.             COMPLETED DIAGNOSTIC STUDIES AND INTERVENTIONS:     Lab Results (last 24 hours)     Procedure Component Value Units Date/Time     Comprehensive Metabolic Panel [[id number]]  (Abnormal) Collected: 04/08/22 1840    Specimen: Blood Updated: 04/08/22 1950     Glucose 102 mg/dL      BUN 10 mg/dL      Creatinine 0.86 mg/dL      Sodium 134 mmol/L      Potassium 4.1 mmol/L      Chloride 100 mmol/L      CO2 24.6 mmol/L      Calcium 9.9 mg/dL      Total Protein 8.0 g/dL      Albumin 4.52 g/dL      ALT (SGPT) 38 U/L      AST (SGOT) 26 U/L      Alkaline Phosphatase 106 U/L      Total Bilirubin 0.6 mg/dL      Globulin 3.5 gm/dL      A/G Ratio 1.3 g/dL      BUN/Creatinine Ratio 11.6     Anion Gap 9.4 mmol/L      eGFR 77.5 mL/min/1.73      Comment: National Kidney Foundation and American Society of Nephrology (ASN) Task Force recommended calculation based on the Chronic Kidney Disease Epidemiology Collaboration (CKD-EPI) equation refit without adjustment for race.       Narrative:      GFR Normal >60  Chronic Kidney Disease <60  Kidney Failure <15      CBC & Differential [[id number]]  (Abnormal) Collected: 04/08/22 1840    Specimen: Blood Updated: 04/08/22 1908    Narrative:      The following orders were created for panel order CBC & Differential.  Procedure                               Abnormality         Status                     ---------                               -----------         ------                     CBC Auto Differential[[id number]]        Abnormal            Final result                 Please view results for these tests on the individual orders.    Lipase [[id number]]  (Normal) Collected: 04/08/22 1840    Specimen: Blood Updated: 04/08/22 1950     Lipase 38 U/L     Magnesium [[id number]]  (Normal) Collected: 04/08/22 1840    Specimen: Blood Updated: 04/08/22 1950     Magnesium 2.3 mg/dL     CBC Auto Differential [[id number]]  (Abnormal) Collected: 04/08/22 1840    Specimen: Blood Updated: 04/08/22 1908     WBC 6.75 10*3/mm3      RBC 5.14 10*6/mm3      Hemoglobin 15.2 g/dL      Hematocrit 46.7 %      MCV 90.9 fL      MCH 29.6 pg      MCHC  32.5 g/dL      RDW 13.2 %      RDW-SD 44.6 fl      MPV 9.4 fL      Platelets 273 10*3/mm3      Neutrophil % 60.0 %      Lymphocyte % 29.0 %      Monocyte % 7.6 %      Eosinophil % 2.4 %      Basophil % 0.7 %      Immature Grans % 0.3 %      Neutrophils, Absolute 4.05 10*3/mm3      Lymphocytes, Absolute 1.96 10*3/mm3      Monocytes, Absolute 0.51 10*3/mm3      Eosinophils, Absolute 0.16 10*3/mm3      Basophils, Absolute 0.05 10*3/mm3      Immature Grans, Absolute 0.02 10*3/mm3      nRBC 0.0 /100 WBC             XR Abdomen KUB   Final Result   Moderate to large volume stool in the colon        This report was finalized on 4/8/2022 5:38 PM by Dr. Maldonado Bhatti MD.                No orders of the defined types were placed in this encounter.        Procedures            MEDICAL DECISION-MAKING AND ED COURSE:     ED Course as of 04/08/22 2014 Fri Apr 08, 2022   4770 MDM: 61-year-old female with chronic constipation presents with inability to pass stool for the past couple weeks.  She has been taking milk of magnesia daily and did take a dose of MiraLAX today without significant improvement.  She has not utilized an enema.  She has had small-volume blood in stool.  Will check general labs, abdominal labs, abdominal XR.  If no significant abnormalities we will plan to provide bowel cleanout regimen with return precautions. [KP]   1754 XR Abdomen KUB  Moderate to large volume stool in colon [KP]   2014 Nondiagnostic, no indication for further imaging.  Bowel cleanout recommendations provided.  Patient also reports she had significant large output stool while here in the emergency department.  Recommend patient follow-up with primary care provider if not better in 2 to 3 days return here for new onset or worsening symptoms.  Patient agreeable to plan, all questions answered. [KP]      ED Course User Index  [KP] Homar Arvizu MD       ?      FINAL IMPRESSION:       1. Constipation, unspecified constipation type          The complaints listed here are new problems to this examiner.      FOLLOW-UP  Sher Blackburn, DO  [address]  [address]  [place] [address]  [phone]            DISPOSITION  ED Disposition     ED Disposition   Discharge    Condition   Stable    Comment   --                   This care is provided during an unprecedented national emergency due to the Novel Coronavirus (COVID-19). COVID-19 infections and transmission risks place heavy strains on healthcare resources. As this pandemic evolves, the Hospital and providers strive to respond fluidly, to remain operational, and to provide care relative to available resources and information. Outcomes are unpredictable and treatments are without well-defined guidelines. Further, the impact of COVID-19 on all aspects of emergency care, including the impact to patients seeking care for reasons other than COVID-19, is unavoidable during this national emergency.    This note was dictated using a [id number] tool. Occasional wrong-word or 'sound-a-like' substitutions may have occurred due to the inherent limitations of voice recognition software. ?Read the chart carefully and recognize, using context, where substitutions have occurred.    Homar Arvizu MD  20:14 EDT  4/8/2022             Homar Arvizu MD  04/08/22 2014

## 2022-04-09 NOTE — DISCHARGE INSTRUCTIONS
You are diagnosed with constipation.  - Follow the diet recommendations in the attached paperwork.  - Increase your fluid intake.  - Take Miralax 1 capful daily. Titrate using more or less as needed to maintain 1-2 soft stools of pudding-like consistency daily.  - Consider enema. You may find various options at your local drug store.  - Avoid or decrease opioid/narcotic use.    For severe constipation, perform the following for a bowel cleanout:  1. Mix Miralax with Gatoride, chill in fridge for 30 minutes  2. Drink Miralax Gatorade mixture and follow with ExLax  3. May repeat treatment in 6 hours for severe cases    Miralax: 5 capfuls  Gatorade: 32 ounces  ExLax: 2 squares    - Follow-up with your primary care provider in 2-3 days if not better  - Do not hesitate to return to the emergency department for new onset or worsening symptoms

## 2022-12-09 ENCOUNTER — TRANSCRIBE ORDERS (OUTPATIENT)
Dept: ADMINISTRATIVE | Facility: HOSPITAL | Age: 62
End: 2022-12-09

## 2022-12-09 DIAGNOSIS — R22.1 LOCALIZED SWELLING, MASS AND LUMP, NECK: Primary | ICD-10-CM

## 2023-01-06 ENCOUNTER — HOSPITAL ENCOUNTER (OUTPATIENT)
Dept: CT IMAGING | Facility: HOSPITAL | Age: 63
Discharge: HOME | End: 2023-01-06
Admitting: FAMILY MEDICINE
Payer: COMMERCIAL

## 2023-01-06 DIAGNOSIS — R22.1 LOCALIZED SWELLING, MASS AND LUMP, NECK: ICD-10-CM

## 2023-01-06 PROCEDURE — 70490 CT SOFT TISSUE NECK W/O DYE: CPT | Performed by: RADIOLOGY

## 2023-01-06 PROCEDURE — 70490 CT SOFT TISSUE NECK W/O DYE: CPT

## 2024-04-29 ENCOUNTER — OFFICE VISIT (OUTPATIENT)
Dept: CARDIOLOGY | Facility: CLINIC | Age: 64
End: 2024-04-29
Payer: COMMERCIAL

## 2024-04-29 ENCOUNTER — LAB (OUTPATIENT)
Dept: LAB | Facility: HOSPITAL | Age: 64
End: 2024-04-29
Payer: COMMERCIAL

## 2024-04-29 VITALS
OXYGEN SATURATION: 97 % | HEART RATE: 61 BPM | BODY MASS INDEX: 19.44 KG/M2 | SYSTOLIC BLOOD PRESSURE: 142 MMHG | HEIGHT: 66 IN | WEIGHT: 121 LBS | DIASTOLIC BLOOD PRESSURE: 82 MMHG

## 2024-04-29 DIAGNOSIS — R73.09 ELEVATED GLUCOSE: ICD-10-CM

## 2024-04-29 DIAGNOSIS — I10 ESSENTIAL HYPERTENSION: Chronic | ICD-10-CM

## 2024-04-29 DIAGNOSIS — I10 ESSENTIAL HYPERTENSION: Primary | Chronic | ICD-10-CM

## 2024-04-29 DIAGNOSIS — I20.0 UNSTABLE ANGINA: Chronic | ICD-10-CM

## 2024-04-29 DIAGNOSIS — R00.2 PALPITATIONS: Chronic | ICD-10-CM

## 2024-04-29 PROCEDURE — 1159F MED LIST DOCD IN RCRD: CPT | Performed by: NURSE PRACTITIONER

## 2024-04-29 PROCEDURE — 80061 LIPID PANEL: CPT

## 2024-04-29 PROCEDURE — 80053 COMPREHEN METABOLIC PANEL: CPT

## 2024-04-29 PROCEDURE — 3079F DIAST BP 80-89 MM HG: CPT | Performed by: NURSE PRACTITIONER

## 2024-04-29 PROCEDURE — 1160F RVW MEDS BY RX/DR IN RCRD: CPT | Performed by: NURSE PRACTITIONER

## 2024-04-29 PROCEDURE — 36415 COLL VENOUS BLD VENIPUNCTURE: CPT

## 2024-04-29 PROCEDURE — 93000 ELECTROCARDIOGRAM COMPLETE: CPT | Performed by: NURSE PRACTITIONER

## 2024-04-29 PROCEDURE — 83036 HEMOGLOBIN GLYCOSYLATED A1C: CPT

## 2024-04-29 PROCEDURE — 85027 COMPLETE CBC AUTOMATED: CPT

## 2024-04-29 PROCEDURE — 99204 OFFICE O/P NEW MOD 45 MIN: CPT | Performed by: NURSE PRACTITIONER

## 2024-04-29 PROCEDURE — 3077F SYST BP >= 140 MM HG: CPT | Performed by: NURSE PRACTITIONER

## 2024-04-29 RX ORDER — POLYETHYLENE GLYCOL 3350 17 G/17G
17 POWDER, FOR SOLUTION ORAL DAILY
COMMUNITY

## 2024-04-29 RX ORDER — BUSPIRONE HYDROCHLORIDE 5 MG/1
5 TABLET ORAL 2 TIMES DAILY
COMMUNITY

## 2024-04-29 RX ORDER — CHLORTHALIDONE 25 MG/1
12.5 TABLET ORAL DAILY
Qty: 30 TABLET | Refills: 1 | Status: SHIPPED | OUTPATIENT
Start: 2024-04-29

## 2024-04-29 RX ORDER — CHLORTHALIDONE 25 MG/1
12.5 TABLET ORAL DAILY
Qty: 30 TABLET | Refills: 1 | Status: SHIPPED | OUTPATIENT
Start: 2024-04-29 | End: 2024-04-29 | Stop reason: SDUPTHER

## 2024-04-29 RX ORDER — ACETAMINOPHEN 500 MG
500 TABLET ORAL EVERY 6 HOURS PRN
COMMUNITY

## 2024-04-29 RX ORDER — CETIRIZINE HYDROCHLORIDE 5 MG/1
5 TABLET ORAL DAILY
COMMUNITY
Start: 2023-11-22

## 2024-04-29 NOTE — PROGRESS NOTES
"Chief Complaint  Palpitations (Patient states palpitations, chest pain , feet swelling, fatigue, shortness of breath )    Subjective          Tigist Bates presents to [facility] CARDIOLOGY for follow up.    History of Present Illness  Ms. Bates presents to [facility] care. About a month ago she began to have swelling in both legs.  It does not go down at night.  She reports that she has had palpitations for approximately five years.  She wore a one month holter monitor for Bran, but she doesn't know the results.    She had gone 6 months without any palpitations, but she had an episode last week that lasted a few seconds, but was so intense that it made her feel near syncopal.  In the past, she has been able to cough and her heart will regulate.  She feels short of breath most of the time.  She thinks that has been worsening.      She also complains of left sided chest pain that has been intermittent for the last ten years.  She says that she had a stress test by Dr. Sotomayor and again, she doesn't know the results.  The pain gets worse when she is under a lot of stress.  It occasionally radiates to the right side of her chest.      She reports that her blood pressure is usually 140s/80s.  She does not have high cholesterol.  She has been told that she is prediabetic.  She smoked for 16 years - 1 PPD - quit 1990    Her father had HTN, and  from CVA/MI at age 57.  Her sister has high blood pressure as well.    Tobacco Use: Medium Risk (2024)    Patient History     Smoking Tobacco Use: Former     Smokeless Tobacco Use: Never     Passive Exposure: Not on file       Objective     Vital Signs:   /82 (BP Location: Left arm, Patient Position: Sitting, Cuff Size: Adult)   Pulse 61   Ht 167.6 cm (66\")   Wt 54.9 kg (121 lb)   SpO2 97%   BMI 19.53 kg/m²       Physical Exam     Result Review :   The following data was reviewed by: KAREN Centeno on 2024:      Data " reviewed : Cardiology studies as detailed below      Last Cardiac Cath      Last Stress test       Last Echo         ECG 12 Lead    Date/Time: 4/29/2024 1:34 PM  Performed by: Suha Fox APRN    Authorized by: Suha Fox APRN  Previous ECG: no previous ECG available  Rhythm: sinus bradycardia  Rate: bradycardic  BPM: 56  ST Flattening: V2  QRS axis: indeterminate  Other findings: low voltage    Clinical impression: non-specific ECG           Current Outpatient Medications   Medication Sig Dispense Refill    acetaminophen (TYLENOL) 500 MG tablet Take 1 tablet by mouth Every 6 (Six) Hours As Needed for Mild Pain.      busPIRone (BUSPAR) 5 MG tablet Take 1 tablet by mouth 2 (Two) Times a Day.      cetirizine (zyrTEC) 5 MG tablet Take 1 tablet by mouth Daily.      magnesium hydroxide (MILK OF MAGNESIA) 400 MG/5ML suspension Take 5 mL by mouth Daily As Needed for Constipation.      pantoprazole (PROTONIX) 40 MG EC tablet Take 1 tablet by mouth Daily. Indications: Gastroesophageal Reflux Disease      polyethylene glycol (MiraLax) 17 g packet Take 17 g by mouth Daily.      chlorthalidone (HYGROTON) 25 MG tablet Take 0.5 tablets by mouth Daily. 30 tablet 1     No current facility-administered medications for this visit.            Assessment and Plan    Problem List Items Addressed This Visit       Essential hypertension - Primary (Chronic)    Relevant Medications    chlorthalidone (HYGROTON) 25 MG tablet    Other Relevant Orders    Comprehensive Metabolic Panel    CBC (No Diff)    Lipid Panel    Unstable angina (Chronic)    Relevant Orders    Stress Test With Myocardial Perfusion (1 Day)    Adult Transthoracic Echo Complete w/ Color, Spectral and Contrast if necessary per protocol    ECG 12 Lead    Palpitations (Chronic)    Relevant Orders    Cardiac Event Monitor    ECG 12 Lead     Other Visit Diagnoses       Elevated glucose        Relevant Orders    Hemoglobin A1c          Diagnoses and all orders for this  visit:    1. Essential hypertension (Primary)  -     chlorthalidone (HYGROTON) 25 MG tablet; Take 0.5 tablets by mouth Daily.  Dispense: 30 tablet; Refill: 1  -     Comprehensive Metabolic Panel; Future  -     CBC (No Diff); Future  -     Lipid Panel; Future    2. Unstable angina  -     Stress Test With Myocardial Perfusion (1 Day); Future  -     Adult Transthoracic Echo Complete w/ Color, Spectral and Contrast if necessary per protocol; Future  -     ECG 12 Lead    3. Elevated glucose  -     Hemoglobin A1c; Future    4. Palpitations  -     Cardiac Event Monitor  -     ECG 12 Lead            Follow Up     Return in about 4 weeks (around 5/27/2024).    Patient was given instructions and counseling regarding her condition or for health maintenance advice. Please see specific information pulled into the AVS if appropriate.       Electronically signed by KAREN Centeno, 04/29/24, 1:36 PM EDT.      Dictated Utilizing Dragon Dictation: Part of this note may be an electronic transcription/translation of spoken language to printed text using the Dragon Dictation System

## 2024-04-30 LAB
ALBUMIN SERPL-MCNC: 4.2 G/DL (ref 3.5–5.2)
ALBUMIN/GLOB SERPL: 1.8 G/DL
ALP SERPL-CCNC: 60 U/L (ref 39–117)
ALT SERPL W P-5'-P-CCNC: 11 U/L (ref 1–33)
ANION GAP SERPL CALCULATED.3IONS-SCNC: 9.9 MMOL/L (ref 5–15)
AST SERPL-CCNC: 14 U/L (ref 1–32)
BILIRUB SERPL-MCNC: 0.5 MG/DL (ref 0–1.2)
BUN SERPL-MCNC: 15 MG/DL (ref 8–23)
BUN/CREAT SERPL: 20.5 (ref 7–25)
CALCIUM SPEC-SCNC: 9.3 MG/DL (ref 8.6–10.5)
CHLORIDE SERPL-SCNC: 110 MMOL/L (ref 98–107)
CHOLEST SERPL-MCNC: 176 MG/DL (ref 0–200)
CO2 SERPL-SCNC: 25.1 MMOL/L (ref 22–29)
CREAT SERPL-MCNC: 0.73 MG/DL (ref 0.57–1)
DEPRECATED RDW RBC AUTO: 45.3 FL (ref 37–54)
EGFRCR SERPLBLD CKD-EPI 2021: 93.1 ML/MIN/1.73
ERYTHROCYTE [DISTWIDTH] IN BLOOD BY AUTOMATED COUNT: 13.2 % (ref 12.3–15.4)
GLOBULIN UR ELPH-MCNC: 2.3 GM/DL
GLUCOSE SERPL-MCNC: 78 MG/DL (ref 65–99)
HBA1C MFR BLD: 5.4 % (ref 4.8–5.6)
HCT VFR BLD AUTO: 37.2 % (ref 34–46.6)
HDLC SERPL-MCNC: 68 MG/DL (ref 40–60)
HGB BLD-MCNC: 12.4 G/DL (ref 12–15.9)
LDLC SERPL CALC-MCNC: 96 MG/DL (ref 0–100)
LDLC/HDLC SERPL: 1.4 {RATIO}
MCH RBC QN AUTO: 30.5 PG (ref 26.6–33)
MCHC RBC AUTO-ENTMCNC: 33.3 G/DL (ref 31.5–35.7)
MCV RBC AUTO: 91.4 FL (ref 79–97)
PLATELET # BLD AUTO: 170 10*3/MM3 (ref 140–450)
PMV BLD AUTO: 10.7 FL (ref 6–12)
POTASSIUM SERPL-SCNC: 4.2 MMOL/L (ref 3.5–5.2)
PROT SERPL-MCNC: 6.5 G/DL (ref 6–8.5)
RBC # BLD AUTO: 4.07 10*6/MM3 (ref 3.77–5.28)
SODIUM SERPL-SCNC: 145 MMOL/L (ref 136–145)
TRIGL SERPL-MCNC: 64 MG/DL (ref 0–150)
VLDLC SERPL-MCNC: 12 MG/DL (ref 5–40)
WBC NRBC COR # BLD AUTO: 4.83 10*3/MM3 (ref 3.4–10.8)

## 2024-05-03 ENCOUNTER — PATIENT ROUNDING (BHMG ONLY) (OUTPATIENT)
Dept: CARDIOLOGY | Facility: CLINIC | Age: 64
End: 2024-05-03
Payer: COMMERCIAL

## 2024-05-03 NOTE — PROGRESS NOTES
A Libretto message has been sent to the patient for patient rounding for [facility]-Interventional Cardiology

## 2024-05-24 ENCOUNTER — HOSPITAL ENCOUNTER (OUTPATIENT)
Dept: CARDIOLOGY | Facility: HOSPITAL | Age: 64
Discharge: HOME | End: 2024-05-24
Payer: COMMERCIAL

## 2024-05-24 ENCOUNTER — HOSPITAL ENCOUNTER (OUTPATIENT)
Dept: NUCLEAR MEDICINE | Facility: HOSPITAL | Age: 64
Discharge: HOME | End: 2024-05-24
Payer: COMMERCIAL

## 2024-05-24 DIAGNOSIS — I20.0 UNSTABLE ANGINA: Chronic | ICD-10-CM

## 2024-05-24 LAB
BH CV NUCLEAR PRIOR STUDY: 3
BH CV REST NUCLEAR ISOTOPE DOSE: 10.6 MCI
BH CV STRESS BP STAGE 1: NORMAL
BH CV STRESS DURATION MIN STAGE 1: 3
BH CV STRESS DURATION MIN STAGE 2: 1
BH CV STRESS DURATION SEC STAGE 1: 0
BH CV STRESS DURATION SEC STAGE 2: 41
BH CV STRESS GRADE STAGE 1: 10
BH CV STRESS GRADE STAGE 2: 12
BH CV STRESS HR STAGE 1: 115
BH CV STRESS HR STAGE 2: 141
BH CV STRESS METS STAGE 1: 5
BH CV STRESS METS STAGE 2: 7.5
BH CV STRESS NUCLEAR ISOTOPE DOSE: 29.6 MCI
BH CV STRESS PROTOCOL 1: NORMAL
BH CV STRESS RECOVERY BP: NORMAL MMHG
BH CV STRESS RECOVERY HR: 90 BPM
BH CV STRESS SPEED STAGE 1: 1.7
BH CV STRESS SPEED STAGE 2: 2.5
BH CV STRESS STAGE 1: 1
BH CV STRESS STAGE 2: 2
LV EF NUC BP: 90 %
MAXIMAL PREDICTED HEART RATE: 158 BPM
PERCENT MAX PREDICTED HR: 89.24 %
STRESS BASELINE BP: NORMAL MMHG
STRESS BASELINE HR: 76 BPM
STRESS PERCENT HR: 105 %
STRESS POST ESTIMATED WORKLOAD: 7 METS
STRESS POST EXERCISE DUR MIN: 4 MIN
STRESS POST EXERCISE DUR SEC: 41 SEC
STRESS POST PEAK BP: NORMAL MMHG
STRESS POST PEAK HR: 141 BPM
STRESS TARGET HR: 134 BPM

## 2024-05-24 PROCEDURE — 78452 HT MUSCLE IMAGE SPECT MULT: CPT

## 2024-05-24 PROCEDURE — A9500 TC99M SESTAMIBI: HCPCS | Performed by: NURSE PRACTITIONER

## 2024-05-24 PROCEDURE — 0 TECHNETIUM SESTAMIBI: Performed by: NURSE PRACTITIONER

## 2024-05-24 PROCEDURE — 93306 TTE W/DOPPLER COMPLETE: CPT

## 2024-05-24 PROCEDURE — 93017 CV STRESS TEST TRACING ONLY: CPT

## 2024-05-24 RX ADMIN — TECHNETIUM TC 99M SESTAMIBI 1 DOSE: 1 INJECTION INTRAVENOUS at 09:44

## 2024-05-24 RX ADMIN — TECHNETIUM TC 99M SESTAMIBI 1 DOSE: 1 INJECTION INTRAVENOUS at 11:53

## 2024-05-25 LAB
BH CV ECHO MEAS - ACS: 1.6 CM
BH CV ECHO MEAS - AO MAX PG: 4.7 MMHG
BH CV ECHO MEAS - AO MEAN PG: 3 MMHG
BH CV ECHO MEAS - AO ROOT DIAM: 2.7 CM
BH CV ECHO MEAS - AO V2 MAX: 108 CM/SEC
BH CV ECHO MEAS - AO V2 VTI: 28.6 CM
BH CV ECHO MEAS - EDV(CUBED): 54.9 ML
BH CV ECHO MEAS - EDV(MOD-SP4): 36.5 ML
BH CV ECHO MEAS - EF(MOD-BP): 65 %
BH CV ECHO MEAS - EF(MOD-SP4): 65.2 %
BH CV ECHO MEAS - ESV(CUBED): 13.8 ML
BH CV ECHO MEAS - ESV(MOD-SP4): 12.7 ML
BH CV ECHO MEAS - FS: 36.8 %
BH CV ECHO MEAS - IVS/LVPW: 0.78 CM
BH CV ECHO MEAS - IVSD: 0.7 CM
BH CV ECHO MEAS - LA DIMENSION: 3.4 CM
BH CV ECHO MEAS - LAT PEAK E' VEL: 13.3 CM/SEC
BH CV ECHO MEAS - LV DIASTOLIC VOL/BSA (35-75): 22.6 CM2
BH CV ECHO MEAS - LV MASS(C)D: 86 GRAMS
BH CV ECHO MEAS - LV SYSTOLIC VOL/BSA (12-30): 7.9 CM2
BH CV ECHO MEAS - LVIDD: 3.8 CM
BH CV ECHO MEAS - LVIDS: 2.4 CM
BH CV ECHO MEAS - LVOT AREA: 3.1 CM2
BH CV ECHO MEAS - LVOT DIAM: 2 CM
BH CV ECHO MEAS - LVPWD: 0.9 CM
BH CV ECHO MEAS - MED PEAK E' VEL: 9.5 CM/SEC
BH CV ECHO MEAS - MV A MAX VEL: 74.2 CM/SEC
BH CV ECHO MEAS - MV E MAX VEL: 74.7 CM/SEC
BH CV ECHO MEAS - MV E/A: 1.01
BH CV ECHO MEAS - PA ACC TIME: 0.19 SEC
BH CV ECHO MEAS - RAP SYSTOLE: 10 MMHG
BH CV ECHO MEAS - RVSP: 31.3 MMHG
BH CV ECHO MEAS - SV(MOD-SP4): 23.8 ML
BH CV ECHO MEAS - SVI(MOD-SP4): 14.7 ML/M2
BH CV ECHO MEAS - TAPSE (>1.6): 2.7 CM
BH CV ECHO MEAS - TR MAX PG: 21.3 MMHG
BH CV ECHO MEAS - TR MAX VEL: 231 CM/SEC
BH CV ECHO MEASUREMENTS AVERAGE E/E' RATIO: 6.55
LEFT ATRIUM VOLUME INDEX: 20.6 ML/M2

## 2024-05-28 ENCOUNTER — OFFICE VISIT (OUTPATIENT)
Dept: CARDIOLOGY | Facility: CLINIC | Age: 64
End: 2024-05-28
Payer: COMMERCIAL

## 2024-05-28 VITALS
HEIGHT: 66 IN | DIASTOLIC BLOOD PRESSURE: 72 MMHG | WEIGHT: 114.8 LBS | OXYGEN SATURATION: 100 % | SYSTOLIC BLOOD PRESSURE: 114 MMHG | HEART RATE: 63 BPM | BODY MASS INDEX: 18.45 KG/M2

## 2024-05-28 DIAGNOSIS — R00.2 PALPITATIONS: Chronic | ICD-10-CM

## 2024-05-28 DIAGNOSIS — I10 ESSENTIAL HYPERTENSION: Chronic | ICD-10-CM

## 2024-05-28 DIAGNOSIS — R94.39 ABNORMAL STRESS TEST: ICD-10-CM

## 2024-05-28 DIAGNOSIS — I20.0 UNSTABLE ANGINA: Primary | Chronic | ICD-10-CM

## 2024-05-28 PROCEDURE — 1160F RVW MEDS BY RX/DR IN RCRD: CPT | Performed by: NURSE PRACTITIONER

## 2024-05-28 PROCEDURE — 3078F DIAST BP <80 MM HG: CPT | Performed by: NURSE PRACTITIONER

## 2024-05-28 PROCEDURE — 99214 OFFICE O/P EST MOD 30 MIN: CPT | Performed by: NURSE PRACTITIONER

## 2024-05-28 PROCEDURE — 3074F SYST BP LT 130 MM HG: CPT | Performed by: NURSE PRACTITIONER

## 2024-05-28 PROCEDURE — 1159F MED LIST DOCD IN RCRD: CPT | Performed by: NURSE PRACTITIONER

## 2024-05-28 RX ORDER — CHLORTHALIDONE 25 MG/1
12.5 TABLET ORAL DAILY
Qty: 30 TABLET | Refills: 1 | Status: SHIPPED | OUTPATIENT
Start: 2024-05-28

## 2024-05-28 RX ORDER — METOPROLOL SUCCINATE 25 MG/1
12.5 TABLET, EXTENDED RELEASE ORAL DAILY
Qty: 15 TABLET | Refills: 5 | Status: SHIPPED | OUTPATIENT
Start: 2024-05-28

## 2024-05-28 RX ORDER — RANOLAZINE 500 MG/1
500 TABLET, EXTENDED RELEASE ORAL 2 TIMES DAILY
Qty: 60 TABLET | Refills: 1 | Status: SHIPPED | OUTPATIENT
Start: 2024-05-28

## 2024-05-28 NOTE — PROGRESS NOTES
"Chief Complaint  Results and Follow-up (Here for test results , states sill having chest pain on occasion )    Subjective          Tigist Bates presents to [facility] CARDIOLOGY for follow up.    History of Present Illness  Ms. Bates presents to review the results of her recent echocardiogram and stress test.  Her last visit to clinic was 04/29/2024 with me.  At that time she complained of intermittent left-sided chest pain that had been present for the last 10 years.  She also complained of palpitations.  An event monitor was placed in addition to the tests previously listed.    Event monitor did not reveal any worrisome dysrhythmia.  Her stress test was positive for EKG changes during exertion.  She reports that she continues to have chest pain particularly with exertion.  Tobacco Use: Medium Risk (6/16/2024)    Patient History     Smoking Tobacco Use: Former     Smokeless Tobacco Use: Never     Passive Exposure: Not on file     Review of Systems - General ROS: positive for  - fatigue  ENT ROS: positive for - nasal congestion and nasal discharge  Hematological and Lymphatic ROS: positive for - bruising  Respiratory ROS: positive for - shortness of breath  Gastrointestinal ROS: positive for - constipation  Genito-Urinary ROS: positive for - urinary frequency/urgency  Musculoskeletal ROS: positive for - pain in back - generalized    Objective     Vital Signs:   /72 (BP Location: Left arm, Patient Position: Sitting, Cuff Size: Adult)   Pulse 63   Ht 167.6 cm (66\")   Wt 52.1 kg (114 lb 12.8 oz)   SpO2 100%   BMI 18.53 kg/m²       Physical Exam  Vitals and nursing note reviewed.   Constitutional:       General: She is not in acute distress.     Appearance: She is normal weight.   HENT:      Head: Normocephalic and atraumatic.   Neck:      Vascular: No carotid bruit.   Cardiovascular:      Rate and Rhythm: Normal rate and regular rhythm.      Heart sounds: No murmur heard.     No " friction rub. No gallop.   Pulmonary:      Effort: Pulmonary effort is normal.      Breath sounds: Normal breath sounds.   Musculoskeletal:      Right lower leg: No edema.      Left lower leg: No edema.   Skin:     General: Skin is warm and dry.   Neurological:      General: No focal deficit present.      Mental Status: She is alert.   Psychiatric:         Mood and Affect: Mood normal.         Behavior: Behavior normal.          Result Review :   The following data was reviewed by: KAREN Centeno on 05/28/2024:  Common labs          4/29/2024    14:05   Common Labs   Glucose 78    BUN 15    Creatinine 0.73    Sodium 145    Potassium 4.2    Chloride 110    Calcium 9.3    Albumin 4.2    Total Bilirubin 0.5    Alkaline Phosphatase 60    AST (SGOT) 14    ALT (SGPT) 11    WBC 4.83    Hemoglobin 12.4    Hematocrit 37.2    Platelets 170    Total Cholesterol 176    Triglycerides 64    HDL Cholesterol 68    LDL Cholesterol  96    Hemoglobin A1C 5.40      Lipid Panel          4/29/2024    14:05   Lipid Panel   Total Cholesterol 176    Triglycerides 64    HDL Cholesterol 68    VLDL Cholesterol 12    LDL Cholesterol  96    LDL/HDL Ratio 1.40      Data reviewed : Cardiology studies as detailed below      Last Cardiac Cath      Last Stress test  Results for orders placed during the hospital encounter of 05/24/24    Stress Test With Myocardial Perfusion (1 Day)    Interpretation Summary    Myocardial perfusion imaging indicates a normal myocardial perfusion study with no evidence of ischemia.    Left ventricular ejection fraction is hyperdynamic (Calculated EF > 70%).    Moderate risk for ischemic heart disease.    TID 0.86.    Findings consistent with an abnormal ECG stress test.       Last Echo  Results for orders placed during the hospital encounter of 05/24/24    Adult Transthoracic Echo Complete w/ Color, Spectral and Contrast if necessary per protocol    Interpretation Summary    Left ventricular systolic function is  normal. Calculated left ventricular EF = 65% Left ventricular ejection fraction appears to be 61 - 65%.    Left ventricular diastolic function was normal.    Estimated right ventricular systolic pressure from tricuspid regurgitation is normal (<35 mmHg).             Current Outpatient Medications   Medication Sig Dispense Refill    acetaminophen (TYLENOL) 500 MG tablet Take 1 tablet by mouth Every 6 (Six) Hours As Needed for Mild Pain.      busPIRone (BUSPAR) 5 MG tablet Take 1 tablet by mouth 2 (Two) Times a Day.      chlorthalidone (HYGROTON) 25 MG tablet Take 0.5 tablets by mouth Daily. 30 tablet 1    polyethylene glycol (MiraLax) 17 g packet Take 17 g by mouth Daily As Needed.      metoprolol succinate XL (TOPROL-XL) 25 MG 24 hr tablet Take 0.5 tablets by mouth Daily. 15 tablet 5    ranolazine (Ranexa) 500 MG 12 hr tablet Take 1 tablet by mouth 2 (Two) Times a Day. 60 tablet 1     No current facility-administered medications for this visit.            Assessment and Plan    Problem List Items Addressed This Visit       Essential hypertension (Chronic)    Relevant Medications    metoprolol succinate XL (TOPROL-XL) 25 MG 24 hr tablet    chlorthalidone (HYGROTON) 25 MG tablet    Unstable angina - Primary (Chronic)    Relevant Medications    ranolazine (Ranexa) 500 MG 12 hr tablet    metoprolol succinate XL (TOPROL-XL) 25 MG 24 hr tablet    Other Relevant Orders    Case Request Cath Lab: Left Heart Cath (Completed)    Palpitations (Chronic)    Relevant Medications    metoprolol succinate XL (TOPROL-XL) 25 MG 24 hr tablet     Other Visit Diagnoses       Abnormal stress test        Relevant Orders    Case Request Cath Lab: Left Heart Cath (Completed)          Diagnoses and all orders for this visit:    1. Unstable angina (Primary)  -     ranolazine (Ranexa) 500 MG 12 hr tablet; Take 1 tablet by mouth 2 (Two) Times a Day.  Dispense: 60 tablet; Refill: 1  -     metoprolol succinate XL (TOPROL-XL) 25 MG 24 hr tablet;  Take 0.5 tablets by mouth Daily.  Dispense: 15 tablet; Refill: 5  -     Case Request Cath Lab: Left Heart Cath    2. Palpitations  -     metoprolol succinate XL (TOPROL-XL) 25 MG 24 hr tablet; Take 0.5 tablets by mouth Daily.  Dispense: 15 tablet; Refill: 5    3. Abnormal stress test  -     Case Request Cath Lab: Left Heart Cath    4. Essential hypertension  -     chlorthalidone (HYGROTON) 25 MG tablet; Take 0.5 tablets by mouth Daily.  Dispense: 30 tablet; Refill: 1      Due to continued complaints of chest pain and a moderate risk EKG stress test, will plan for invasive coronary angiogram.      Follow Up     No follow-ups on file.    Patient was given instructions and counseling regarding her condition or for health maintenance advice. Please see specific information pulled into the AVS if appropriate.         Electronically signed by KAREN Centeno, 06/16/24, 9:59 AM EDT.    Dictated Utilizing Dragon Dictation: Part of this note may be an electronic transcription/translation of spoken language to printed text using the Dragon Dictation System

## 2024-05-29 ENCOUNTER — TELEPHONE (OUTPATIENT)
Dept: CARDIOLOGY | Facility: CLINIC | Age: 64
End: 2024-05-29
Payer: COMMERCIAL

## 2024-05-29 NOTE — TELEPHONE ENCOUNTER
Patient called complaining of not feeling well, and would like to be removed from the Cath schedule for tomorrow.

## 2025-06-03 ENCOUNTER — HOSPITAL ENCOUNTER (EMERGENCY)
Facility: HOSPITAL | Age: 65
Discharge: PSYCHIATRIC OR SUBSTANCE USE FACILITY | DRG: 885 | End: 2025-06-03
Attending: STUDENT IN AN ORGANIZED HEALTH CARE EDUCATION/TRAINING PROGRAM | Admitting: STUDENT IN AN ORGANIZED HEALTH CARE EDUCATION/TRAINING PROGRAM
Payer: COMMERCIAL

## 2025-06-03 ENCOUNTER — HOSPITAL ENCOUNTER (INPATIENT)
Facility: HOSPITAL | Age: 65
LOS: 7 days | Discharge: HOME | DRG: 885 | End: 2025-06-10
Attending: PSYCHIATRY & NEUROLOGY | Admitting: PSYCHIATRY & NEUROLOGY
Payer: COMMERCIAL

## 2025-06-03 VITALS
RESPIRATION RATE: 18 BRPM | HEART RATE: 62 BPM | TEMPERATURE: 98.4 F | SYSTOLIC BLOOD PRESSURE: 155 MMHG | HEIGHT: 66 IN | BODY MASS INDEX: 20.89 KG/M2 | WEIGHT: 130 LBS | OXYGEN SATURATION: 100 % | DIASTOLIC BLOOD PRESSURE: 85 MMHG

## 2025-06-03 DIAGNOSIS — F32.A DEPRESSION WITH SUICIDAL IDEATION: Primary | ICD-10-CM

## 2025-06-03 DIAGNOSIS — R45.851 DEPRESSION WITH SUICIDAL IDEATION: Primary | ICD-10-CM

## 2025-06-03 LAB
ALBUMIN SERPL-MCNC: 4.3 G/DL (ref 3.5–5.2)
ALBUMIN/GLOB SERPL: 1.5 G/DL
ALP SERPL-CCNC: 70 U/L (ref 39–117)
ALT SERPL W P-5'-P-CCNC: 8 U/L (ref 1–33)
AMPHET+METHAMPHET UR QL: NEGATIVE
AMPHETAMINES UR QL: NEGATIVE
ANION GAP SERPL CALCULATED.3IONS-SCNC: 10.2 MMOL/L (ref 5–15)
AST SERPL-CCNC: 19 U/L (ref 1–32)
BACTERIA UR QL AUTO: NORMAL /HPF
BARBITURATES UR QL SCN: NEGATIVE
BASOPHILS # BLD AUTO: 0.03 10*3/MM3 (ref 0–0.2)
BASOPHILS NFR BLD AUTO: 0.7 % (ref 0–1.5)
BENZODIAZ UR QL SCN: NEGATIVE
BILIRUB SERPL-MCNC: 0.8 MG/DL (ref 0–1.2)
BILIRUB UR QL STRIP: NEGATIVE
BUN SERPL-MCNC: 16 MG/DL (ref 8–23)
BUN/CREAT SERPL: 20.8 (ref 7–25)
BUPRENORPHINE SERPL-MCNC: NEGATIVE NG/ML
CALCIUM SPEC-SCNC: 9.2 MG/DL (ref 8.6–10.5)
CANNABINOIDS SERPL QL: NEGATIVE
CHLORIDE SERPL-SCNC: 105 MMOL/L (ref 98–107)
CLARITY UR: CLEAR
CO2 SERPL-SCNC: 24.8 MMOL/L (ref 22–29)
COCAINE UR QL: NEGATIVE
COLOR UR: YELLOW
CREAT SERPL-MCNC: 0.77 MG/DL (ref 0.57–1)
DEPRECATED RDW RBC AUTO: 45.2 FL (ref 37–54)
EGFRCR SERPLBLD CKD-EPI 2021: 86.8 ML/MIN/1.73
EOSINOPHIL # BLD AUTO: 0.17 10*3/MM3 (ref 0–0.4)
EOSINOPHIL NFR BLD AUTO: 3.9 % (ref 0.3–6.2)
ERYTHROCYTE [DISTWIDTH] IN BLOOD BY AUTOMATED COUNT: 13.2 % (ref 12.3–15.4)
ETHANOL BLD-MCNC: <10 MG/DL (ref 0–10)
ETHANOL UR QL: <0.01 %
FENTANYL UR-MCNC: NEGATIVE NG/ML
FLUAV RNA RESP QL NAA+PROBE: NOT DETECTED
FLUBV RNA ISLT QL NAA+PROBE: NOT DETECTED
GLOBULIN UR ELPH-MCNC: 2.9 GM/DL
GLUCOSE SERPL-MCNC: 84 MG/DL (ref 65–99)
GLUCOSE UR STRIP-MCNC: NEGATIVE MG/DL
HAV IGM SERPL QL IA: NORMAL
HBV CORE IGM SERPL QL IA: NORMAL
HBV SURFACE AG SERPL QL IA: NORMAL
HCT VFR BLD AUTO: 39 % (ref 34–46.6)
HCV AB SER QL: NORMAL
HGB BLD-MCNC: 12.3 G/DL (ref 12–15.9)
HGB UR QL STRIP.AUTO: NEGATIVE
HOLD SPECIMEN: NORMAL
HOLD SPECIMEN: NORMAL
HYALINE CASTS UR QL AUTO: NORMAL /LPF
IMM GRANULOCYTES # BLD AUTO: 0.01 10*3/MM3 (ref 0–0.05)
IMM GRANULOCYTES NFR BLD AUTO: 0.2 % (ref 0–0.5)
KETONES UR QL STRIP: NEGATIVE
LEUKOCYTE ESTERASE UR QL STRIP.AUTO: ABNORMAL
LYMPHOCYTES # BLD AUTO: 1.64 10*3/MM3 (ref 0.7–3.1)
LYMPHOCYTES NFR BLD AUTO: 38 % (ref 19.6–45.3)
MAGNESIUM SERPL-MCNC: 2.1 MG/DL (ref 1.6–2.4)
MCH RBC QN AUTO: 29.4 PG (ref 26.6–33)
MCHC RBC AUTO-ENTMCNC: 31.5 G/DL (ref 31.5–35.7)
MCV RBC AUTO: 93.3 FL (ref 79–97)
METHADONE UR QL SCN: NEGATIVE
MONOCYTES # BLD AUTO: 0.37 10*3/MM3 (ref 0.1–0.9)
MONOCYTES NFR BLD AUTO: 8.6 % (ref 5–12)
NEUTROPHILS NFR BLD AUTO: 2.1 10*3/MM3 (ref 1.7–7)
NEUTROPHILS NFR BLD AUTO: 48.6 % (ref 42.7–76)
NITRITE UR QL STRIP: NEGATIVE
NRBC BLD AUTO-RTO: 0 /100 WBC (ref 0–0.2)
OPIATES UR QL: NEGATIVE
OXYCODONE UR QL SCN: NEGATIVE
PCP UR QL SCN: NEGATIVE
PH UR STRIP.AUTO: 7.5 [PH] (ref 5–8)
PLATELET # BLD AUTO: 171 10*3/MM3 (ref 140–450)
PMV BLD AUTO: 9.2 FL (ref 6–12)
POTASSIUM SERPL-SCNC: 4.2 MMOL/L (ref 3.5–5.2)
PROT SERPL-MCNC: 7.2 G/DL (ref 6–8.5)
PROT UR QL STRIP: NEGATIVE
RBC # BLD AUTO: 4.18 10*6/MM3 (ref 3.77–5.28)
RBC # UR STRIP: NORMAL /HPF
REF LAB TEST METHOD: NORMAL
SARS-COV-2 RNA RESP QL NAA+PROBE: NOT DETECTED
SODIUM SERPL-SCNC: 140 MMOL/L (ref 136–145)
SP GR UR STRIP: 1.01 (ref 1–1.03)
SQUAMOUS #/AREA URNS HPF: NORMAL /HPF
TRICYCLICS UR QL SCN: NEGATIVE
UROBILINOGEN UR QL STRIP: ABNORMAL
WBC # UR STRIP: NORMAL /HPF
WBC NRBC COR # BLD AUTO: 4.32 10*3/MM3 (ref 3.4–10.8)
WHOLE BLOOD HOLD COAG: NORMAL
WHOLE BLOOD HOLD SPECIMEN: NORMAL

## 2025-06-03 PROCEDURE — 81001 URINALYSIS AUTO W/SCOPE: CPT | Performed by: PHYSICIAN ASSISTANT

## 2025-06-03 PROCEDURE — 87636 SARSCOV2 & INF A&B AMP PRB: CPT | Performed by: PHYSICIAN ASSISTANT

## 2025-06-03 PROCEDURE — 36415 COLL VENOUS BLD VENIPUNCTURE: CPT

## 2025-06-03 PROCEDURE — 80053 COMPREHEN METABOLIC PANEL: CPT | Performed by: PHYSICIAN ASSISTANT

## 2025-06-03 PROCEDURE — 80074 ACUTE HEPATITIS PANEL: CPT | Performed by: PSYCHIATRY & NEUROLOGY

## 2025-06-03 PROCEDURE — 85025 COMPLETE CBC W/AUTO DIFF WBC: CPT | Performed by: PHYSICIAN ASSISTANT

## 2025-06-03 PROCEDURE — 83735 ASSAY OF MAGNESIUM: CPT | Performed by: PHYSICIAN ASSISTANT

## 2025-06-03 PROCEDURE — 99285 EMERGENCY DEPT VISIT HI MDM: CPT

## 2025-06-03 PROCEDURE — 82077 ASSAY SPEC XCP UR&BREATH IA: CPT | Performed by: PHYSICIAN ASSISTANT

## 2025-06-03 PROCEDURE — 93005 ELECTROCARDIOGRAM TRACING: CPT | Performed by: STUDENT IN AN ORGANIZED HEALTH CARE EDUCATION/TRAINING PROGRAM

## 2025-06-03 PROCEDURE — 93010 ELECTROCARDIOGRAM REPORT: CPT | Performed by: INTERNAL MEDICINE

## 2025-06-03 PROCEDURE — 80307 DRUG TEST PRSMV CHEM ANLYZR: CPT | Performed by: PHYSICIAN ASSISTANT

## 2025-06-03 RX ORDER — BISACODYL 5 MG/1
5 TABLET, DELAYED RELEASE ORAL DAILY PRN
Status: DISCONTINUED | OUTPATIENT
Start: 2025-06-03 | End: 2025-06-10 | Stop reason: HOSPADM

## 2025-06-03 RX ORDER — BENZONATATE 100 MG/1
100 CAPSULE ORAL 3 TIMES DAILY PRN
Status: DISCONTINUED | OUTPATIENT
Start: 2025-06-03 | End: 2025-06-10 | Stop reason: HOSPADM

## 2025-06-03 RX ORDER — IBUPROFEN 400 MG/1
400 TABLET, FILM COATED ORAL EVERY 6 HOURS PRN
Status: DISCONTINUED | OUTPATIENT
Start: 2025-06-03 | End: 2025-06-10 | Stop reason: HOSPADM

## 2025-06-03 RX ORDER — ECHINACEA PURPUREA EXTRACT 125 MG
2 TABLET ORAL AS NEEDED
Status: DISCONTINUED | OUTPATIENT
Start: 2025-06-03 | End: 2025-06-10 | Stop reason: HOSPADM

## 2025-06-03 RX ORDER — CETIRIZINE HYDROCHLORIDE 10 MG/1
5 TABLET ORAL DAILY PRN
Status: DISCONTINUED | OUTPATIENT
Start: 2025-06-03 | End: 2025-06-10 | Stop reason: HOSPADM

## 2025-06-03 RX ORDER — ONDANSETRON 4 MG/1
4 TABLET, ORALLY DISINTEGRATING ORAL EVERY 6 HOURS PRN
Status: DISCONTINUED | OUTPATIENT
Start: 2025-06-03 | End: 2025-06-10 | Stop reason: HOSPADM

## 2025-06-03 RX ORDER — ACETAMINOPHEN 325 MG/1
650 TABLET ORAL EVERY 6 HOURS PRN
Status: DISCONTINUED | OUTPATIENT
Start: 2025-06-03 | End: 2025-06-10 | Stop reason: HOSPADM

## 2025-06-03 RX ORDER — BUSPIRONE HYDROCHLORIDE 5 MG/1
5 TABLET ORAL 2 TIMES DAILY PRN
Status: CANCELLED | OUTPATIENT
Start: 2025-06-03

## 2025-06-03 RX ORDER — LOPERAMIDE HYDROCHLORIDE 2 MG/1
2 CAPSULE ORAL
Status: DISCONTINUED | OUTPATIENT
Start: 2025-06-03 | End: 2025-06-10 | Stop reason: HOSPADM

## 2025-06-03 RX ORDER — CETIRIZINE HYDROCHLORIDE 5 MG/1
5 TABLET ORAL DAILY PRN
COMMUNITY

## 2025-06-03 RX ORDER — ALUMINA, MAGNESIA, AND SIMETHICONE 2400; 2400; 240 MG/30ML; MG/30ML; MG/30ML
15 SUSPENSION ORAL EVERY 6 HOURS PRN
Status: DISCONTINUED | OUTPATIENT
Start: 2025-06-03 | End: 2025-06-10 | Stop reason: HOSPADM

## 2025-06-03 RX ORDER — TRAZODONE HYDROCHLORIDE 50 MG/1
12.5 TABLET ORAL NIGHTLY PRN
Status: DISPENSED | OUTPATIENT
Start: 2025-06-03 | End: 2025-06-05

## 2025-06-03 RX ADMIN — TRAZODONE HYDROCHLORIDE 12.5 MG: 50 TABLET ORAL at 20:47

## 2025-06-03 NOTE — NURSING NOTE
Presented to ED reporting SI with no plan.  Denies any prior suicide attempts.  Reports that she was first prescribed buspar when she was admitted here in 2020.  Dr Blackburn currently prescribes.  She is wanting to stop taking buspar stating that she stays too drowsy while taking it.  She reports that her sleep has been excessive and appetite poor.  Also, reported to intake RN that she is having thoughts to hurt someone else, but would not disclose who this person is.  Denies HI.  She lives alone and drove herself to the hospital today.  She has never been  and has no children.  Plans to return home upon discharge.  Two prior admissions here in 2020.  Report given to JJ Anderson.

## 2025-06-03 NOTE — ED PROVIDER NOTES
"Subjective   History of Present Illness  64-year-old female who presents to the ED today for mental health evaluation.  She states she wants off of her BuSpar.  She states it makes her feel bad.  She states she has been on it for 5 years and is currently on 5 mg twice a day.  She states she last took it last night.  She states her doctor put her on Paxil to try to get her off the BuSpar but it did not work.  She now only takes the Paxil every now and then.  She states the BuSpar makes her feel draggy and like she cannot function.  She does report intermittent suicidal ideations.  She states she has made a plan to use a gun in the past but has no current access to a gun.  She does report homicidal ideations but will not say who towards.  She states it is not towards a person she is in close contact with.  She denies any drug or alcohol use.  She states her appetite and sleep have both been poor.  She states she is also \"hearing crazy stuff.\"    History provided by:  Patient  Mental Health Problem  Presenting symptoms: depression, hallucinations and suicidal thoughts    Degree of incapacity (severity):  Moderate  Timing:  Constant  Progression:  Unchanged  Chronicity:  New  Context: not alcohol use and not drug abuse    Associated symptoms: appetite change and insomnia    Risk factors: hx of mental illness        Review of Systems   Constitutional:  Positive for appetite change.   HENT: Negative.     Eyes: Negative.    Respiratory: Negative.     Cardiovascular: Negative.    Gastrointestinal: Negative.    Genitourinary: Negative.    Musculoskeletal: Negative.    Skin: Negative.    Neurological: Negative.    Psychiatric/Behavioral:  Positive for dysphoric mood, hallucinations, sleep disturbance and suicidal ideas. The patient has insomnia.    All other systems reviewed and are negative.      Past Medical History:   Diagnosis Date    Anxiety     Chronic pain     Depression     GERD (gastroesophageal reflux disease)     " Suicidal thoughts        No Known Allergies    Past Surgical History:   Procedure Laterality Date    TONSILLECTOMY         Family History   Problem Relation Age of Onset    Ulcerative colitis Mother     Hypertension Father     Depression Sister     Breast cancer Neg Hx        Social History     Socioeconomic History    Marital status: Single     Spouse name: denies    Number of children: 0    Years of education: associates degree    Highest education level: Associate degree: academic program   Tobacco Use    Smoking status: Former     Passive exposure: Never    Smokeless tobacco: Never   Vaping Use    Vaping status: Never Used   Substance and Sexual Activity    Alcohol use: Not Currently     Comment: occasional use    Drug use: Not Currently     Types: Benzodiazepines, Marijuana     Comment: denies    Sexual activity: Defer     Partners: Male           Objective   Physical Exam  Vitals and nursing note reviewed.   Constitutional:       General: She is not in acute distress.     Appearance: Normal appearance. She is not diaphoretic.   HENT:      Head: Normocephalic and atraumatic.      Right Ear: External ear normal.      Left Ear: External ear normal.      Nose: Nose normal.   Eyes:      Conjunctiva/sclera: Conjunctivae normal.      Pupils: Pupils are equal, round, and reactive to light.   Cardiovascular:      Rate and Rhythm: Normal rate and regular rhythm.      Pulses: Normal pulses.      Heart sounds: Normal heart sounds.   Pulmonary:      Effort: Pulmonary effort is normal.      Breath sounds: Normal breath sounds.   Abdominal:      General: Bowel sounds are normal.      Palpations: Abdomen is soft.   Musculoskeletal:         General: Normal range of motion.      Cervical back: Normal range of motion and neck supple.   Skin:     General: Skin is warm and dry.      Capillary Refill: Capillary refill takes less than 2 seconds.   Neurological:      General: No focal deficit present.      Mental Status: She is alert  and oriented to person, place, and time.   Psychiatric:         Mood and Affect: Mood is depressed.         Speech: Speech normal.         Behavior: Behavior is withdrawn. Behavior is cooperative.         Thought Content: Thought content includes homicidal and suicidal ideation. Thought content includes suicidal plan. Thought content does not include homicidal plan.         Procedures       Results for orders placed or performed during the hospital encounter of 06/03/25   COVID-19 and FLU A/B PCR, 1 HR TAT - Swab, Nasopharynx    Collection Time: 06/03/25 12:23 PM    Specimen: Nasopharynx; Swab   Result Value Ref Range    COVID19 Not Detected Not Detected - Ref. Range    Influenza A PCR Not Detected Not Detected    Influenza B PCR Not Detected Not Detected   Urinalysis With Microscopic If Indicated (No Culture) - Urine, Clean Catch    Collection Time: 06/03/25 12:26 PM    Specimen: Urine, Clean Catch   Result Value Ref Range    Color, UA Yellow Yellow, Straw    Appearance, UA Clear Clear    pH, UA 7.5 5.0 - 8.0    Specific Gravity, UA 1.008 1.005 - 1.030    Glucose, UA Negative Negative    Ketones, UA Negative Negative    Bilirubin, UA Negative Negative    Blood, UA Negative Negative    Protein, UA Negative Negative    Leuk Esterase, UA Small (1+) (A) Negative    Nitrite, UA Negative Negative    Urobilinogen, UA 1.0 E.U./dL 0.2 - 1.0 E.U./dL   Urine Drug Screen - Urine, Clean Catch    Collection Time: 06/03/25 12:26 PM    Specimen: Urine, Clean Catch   Result Value Ref Range    THC, Screen, Urine Negative Negative    Phencyclidine (PCP), Urine Negative Negative    Cocaine Screen, Urine Negative Negative    Methamphetamine, Ur Negative Negative    Opiate Screen Negative Negative    Amphetamine Screen, Urine Negative Negative    Benzodiazepine Screen, Urine Negative Negative    Tricyclic Antidepressants Screen Negative Negative    Methadone Screen, Urine Negative Negative    Barbiturates Screen, Urine Negative Negative     Oxycodone Screen, Urine Negative Negative    Buprenorphine, Screen, Urine Negative Negative   Fentanyl, Urine - Urine, Clean Catch    Collection Time: 06/03/25 12:26 PM    Specimen: Urine, Clean Catch   Result Value Ref Range    Fentanyl, Urine Negative Negative   Urinalysis, Microscopic Only - Urine, Clean Catch    Collection Time: 06/03/25 12:26 PM    Specimen: Urine, Clean Catch   Result Value Ref Range    RBC, UA 0-2 None Seen, 0-2 /HPF    WBC, UA 0-2 None Seen, 0-2 /HPF    Bacteria, UA None Seen None Seen /HPF    Squamous Epithelial Cells, UA 0-2 None Seen, 0-2 /HPF    Hyaline Casts, UA None Seen None Seen /LPF    Methodology Automated Microscopy    Comprehensive Metabolic Panel    Collection Time: 06/03/25 12:44 PM    Specimen: Blood   Result Value Ref Range    Glucose 84 65 - 99 mg/dL    BUN 16.0 8.0 - 23.0 mg/dL    Creatinine 0.77 0.57 - 1.00 mg/dL    Sodium 140 136 - 145 mmol/L    Potassium 4.2 3.5 - 5.2 mmol/L    Chloride 105 98 - 107 mmol/L    CO2 24.8 22.0 - 29.0 mmol/L    Calcium 9.2 8.6 - 10.5 mg/dL    Total Protein 7.2 6.0 - 8.5 g/dL    Albumin 4.3 3.5 - 5.2 g/dL    ALT (SGPT) 8 1 - 33 U/L    AST (SGOT) 19 1 - 32 U/L    Alkaline Phosphatase 70 39 - 117 U/L    Total Bilirubin 0.8 0.0 - 1.2 mg/dL    Globulin 2.9 gm/dL    A/G Ratio 1.5 g/dL    BUN/Creatinine Ratio 20.8 7.0 - 25.0    Anion Gap 10.2 5.0 - 15.0 mmol/L    eGFR 86.8 >60.0 mL/min/1.73   Ethanol    Collection Time: 06/03/25 12:44 PM    Specimen: Blood   Result Value Ref Range    Ethanol <10 0 - 10 mg/dL    Ethanol % <0.010 %   Magnesium    Collection Time: 06/03/25 12:44 PM    Specimen: Blood   Result Value Ref Range    Magnesium 2.1 1.6 - 2.4 mg/dL   CBC Auto Differential    Collection Time: 06/03/25 12:44 PM    Specimen: Blood   Result Value Ref Range    WBC 4.32 3.40 - 10.80 10*3/mm3    RBC 4.18 3.77 - 5.28 10*6/mm3    Hemoglobin 12.3 12.0 - 15.9 g/dL    Hematocrit 39.0 34.0 - 46.6 %    MCV 93.3 79.0 - 97.0 fL    MCH 29.4 26.6 - 33.0 pg     MCHC 31.5 31.5 - 35.7 g/dL    RDW 13.2 12.3 - 15.4 %    RDW-SD 45.2 37.0 - 54.0 fl    MPV 9.2 6.0 - 12.0 fL    Platelets 171 140 - 450 10*3/mm3    Neutrophil % 48.6 42.7 - 76.0 %    Lymphocyte % 38.0 19.6 - 45.3 %    Monocyte % 8.6 5.0 - 12.0 %    Eosinophil % 3.9 0.3 - 6.2 %    Basophil % 0.7 0.0 - 1.5 %    Immature Grans % 0.2 0.0 - 0.5 %    Neutrophils, Absolute 2.10 1.70 - 7.00 10*3/mm3    Lymphocytes, Absolute 1.64 0.70 - 3.10 10*3/mm3    Monocytes, Absolute 0.37 0.10 - 0.90 10*3/mm3    Eosinophils, Absolute 0.17 0.00 - 0.40 10*3/mm3    Basophils, Absolute 0.03 0.00 - 0.20 10*3/mm3    Immature Grans, Absolute 0.01 0.00 - 0.05 10*3/mm3    nRBC 0.0 0.0 - 0.2 /100 WBC   Green Top (Gel)    Collection Time: 06/03/25 12:44 PM   Result Value Ref Range    Extra Tube Hold for add-ons.    Lavender Top    Collection Time: 06/03/25 12:44 PM   Result Value Ref Range    Extra Tube hold for add-on    Gold Top - SST    Collection Time: 06/03/25 12:44 PM   Result Value Ref Range    Extra Tube Hold for add-ons.    Light Blue Top    Collection Time: 06/03/25 12:44 PM   Result Value Ref Range    Extra Tube Hold for add-ons.    ECG 12 Lead Other; psych    Collection Time: 06/03/25  2:12 PM   Result Value Ref Range    QT Interval 418 ms    QTC Interval 424 ms          ED Course  ED Course as of 06/03/25 1447   Tue Jun 03, 2025   1415 ECG 12 Lead Other; psych  Normal sinus rhythm, rate 62, QTc 424, no acute ST or T wave changes [CW]      ED Course User Index  [CW] Homer Rivera, DO                                                       Medical Decision Making  64-year-old female presents to the ED today for mental health evaluation.  She was medically cleared for the evaluation.  Psychiatry was consulted and she will be admitted.  Please see the intake nurse's note for full assessment.    Problems Addressed:  Depression with suicidal ideation: complicated acute illness or injury    Amount and/or Complexity of Data  Reviewed  Labs: ordered.  ECG/medicine tests: ordered. Decision-making details documented in ED Course.        Final diagnoses:   Depression with suicidal ideation       ED Disposition  ED Disposition       ED Disposition   DC/Transfer to Behavioral Health    Condition   Stable    Comment   --               No follow-up provider specified.       Medication List      No changes were made to your prescriptions during this visit.            Jenny Guzman PA  06/03/25 [id number]

## 2025-06-03 NOTE — PLAN OF CARE
Problem: Adult Behavioral Health Plan of Care  Goal: Plan of Care Review  Outcome: Progressing  Flowsheets (Taken 6/3/2025 1631)  Progress: no change  Patient Agreement with Plan of Care: agrees  Outcome Evaluation: new admit, quiet, calm and cooperative  Plan of Care Reviewed With: patient   Goal Outcome Evaluation:  Plan of Care Reviewed With: patient  Patient Agreement with Plan of Care: agrees     Progress: no change  Outcome Evaluation: new admit, quiet, calm and cooperative

## 2025-06-03 NOTE — NURSING NOTE
"Pt assessment complete     Pt brought to intake seeking a psychiatric evaluation     Pt states she has been feeling awful and that she has been trying to get off of her Buspar but she cannot stop it on her own. Pt also reports that her Provider prescribed her Paxil to assist with getting off of Buspar     Pt reports si with no plan   Pt denies hi but states that she has thoughts of hurting someone pt states when asking who this person is \"I won't say.\"   Pt denies avh   Excessive sleeping, poor appetite   Depression 10   Anxiety 10   Denies substance use   Pt reports stressors are living situation. She states she lives in an apartment alone     "

## 2025-06-04 PROBLEM — F41.9 ANXIETY DISORDER, UNSPECIFIED: Status: ACTIVE | Noted: 2025-06-04

## 2025-06-04 PROBLEM — F33.8 OTHER RECURRENT DEPRESSIVE DISORDERS: Status: ACTIVE | Noted: 2020-03-23

## 2025-06-04 LAB
CHOLEST SERPL-MCNC: 172 MG/DL (ref 0–200)
HBA1C MFR BLD: 5.29 % (ref 4.8–5.6)
HDLC SERPL-MCNC: 62 MG/DL (ref 40–60)
LDLC SERPL CALC-MCNC: 97 MG/DL (ref 0–100)
LDLC/HDLC SERPL: 1.56 {RATIO}
QT INTERVAL: 418 MS
QTC INTERVAL: 424 MS
TRIGL SERPL-MCNC: 65 MG/DL (ref 0–150)
VLDLC SERPL-MCNC: 13 MG/DL (ref 5–40)

## 2025-06-04 PROCEDURE — 80061 LIPID PANEL: CPT | Performed by: PSYCHIATRY & NEUROLOGY

## 2025-06-04 PROCEDURE — 84443 ASSAY THYROID STIM HORMONE: CPT | Performed by: PSYCHIATRY & NEUROLOGY

## 2025-06-04 PROCEDURE — 99223 1ST HOSP IP/OBS HIGH 75: CPT | Performed by: PSYCHIATRY & NEUROLOGY

## 2025-06-04 PROCEDURE — 83036 HEMOGLOBIN GLYCOSYLATED A1C: CPT | Performed by: PSYCHIATRY & NEUROLOGY

## 2025-06-04 RX ORDER — ESCITALOPRAM OXALATE 10 MG/1
5 TABLET ORAL DAILY
Status: DISCONTINUED | OUTPATIENT
Start: 2025-06-04 | End: 2025-06-05

## 2025-06-04 RX ADMIN — ACETAMINOPHEN 650 MG: 325 TABLET ORAL at 08:19

## 2025-06-04 RX ADMIN — MAGNESIUM HYDROXIDE 10 ML: 2400 SUSPENSION ORAL at 21:11

## 2025-06-04 RX ADMIN — TRAZODONE HYDROCHLORIDE 12.5 MG: 50 TABLET ORAL at 21:13

## 2025-06-04 RX ADMIN — ESCITALOPRAM OXALATE 5 MG: 10 TABLET ORAL at 17:16

## 2025-06-04 NOTE — PLAN OF CARE
Goal Outcome Evaluation:           Progress: improving  Outcome Evaluation: Patient and therapist met to review care plan, social history, aftercare recommendation, and disposition plan; patient agreeable.           Problem: Adult Behavioral Health Plan of Care  Goal: Plan of Care Review  Outcome: Progressing  Flowsheets  Taken 6/4/2025 1542 by Nik Adkins  Progress: improving  Outcome Evaluation:   Patient and therapist met to review care plan, social history, aftercare recommendation, and disposition plan   patient agreeable.  Taken 6/4/2025 1505 by Whitley Kimball RN  Patient Agreement with Plan of Care: agrees  Plan of Care Reviewed With: patient  Goal: Patient-Specific Goal (Individualization)  Outcome: Progressing  Flowsheets  Taken 6/4/2025 1542 by Nik Adkins  Patient/Family-Specific Goals (Include Timeframe): Identify 2-3 coping skills, complete safety plan, complete aftercare plan, and deny SI/HI within 1-7 days.  Individualized Care Needs: Therapist to offer 1-4 therapy sessions, aftercare planning, safety planning, daily groups, and brief CBT/MI interventions.  Taken 6/4/2025 1530 by Nik Adkins  Patient Personal Strengths:   resilient   resourceful   self-reliant   motivated for treatment   expressive of needs   expressive of emotions  Patient Vulnerabilities:   lacks insight into illness   poor impulse control   limited support system  Taken 6/3/2025 1518 by Deidre George RN  Anxieties, Fears or Concerns: None verbalized  Goal: Optimized Coping Skills in Response to Life Stressors  Outcome: Progressing  Intervention: Promote Effective Coping Strategies  Flowsheets (Taken 6/4/2025 1542)  Supportive Measures:   active listening utilized   counseling provided   decision-making supported   positive reinforcement provided   mindfulness techniques promoted   self-reflection promoted   self-responsibility promoted   verbalization of feelings encouraged  Goal: Develops/Participates in  Therapeutic [place] to Support Successful Transition  Outcome: Progressing  Intervention: Foster Therapeutic [place]  Flowsheets (Taken 6/4/2025 1542)  Trust Relationship/Rapport:   care explained   choices provided   reassurance provided   thoughts/feelings acknowledged   emotional support provided   questions answered   empathic listening provided   questions encouraged  Intervention: Mutually Develop Transition Plan  Flowsheets  Taken 6/4/2025 1542 by Nik Adkins  Outpatient/Agency/Support Group Needs:   outpatient counseling   outpatient medication management  Discharge Coordination/Progress:   Therapist and patient met on this date to complete discharge needs assessment   patient considering outpatient services.  Transition Support:   crisis management plan promoted   crisis management plan verbalized   follow-up care discussed  Anticipated Discharge Disposition: home or self-care  Current Discharge Risk: lives alone  Concerns to be Addressed:   medication   mental health  Offered/Gave Vendor List: no  Taken 6/4/2025 1541 by Nik Adkins  Readmission Within the Last 30 Days: no previous admission in last 30 days  Taken 6/3/2025 1518 by Deidre George, RN  Transportation Anticipated: car, drives self  Patient/Family Anticipated Services at Transition:   mental health services   outpatient care  Patient/Family Anticipates Transition to: home     DATA:  Therapist met individually with Patient this date for initial evaluation.  Introduced self as Therapist and the role of a positive therapeutic relationship; Patient agreeable.      Therapist encouraged Patient to speak openly and honestly about any issues or stressors during treatment stay. Therapist explained how open communication is significant to providing most effective care.      Therapist completed psychosocial assessment, integrated summary, reviewed care plans, disposition planning and discussed hospitalization expectations and treatment goals  this date.     Therapist provided education regarding different levels of care and is recommending outpatient therapy and medication management for most appropriate aftercare. Patient agreeable. Patient specifically asking for an agency that provides case management, but is not a fan of comp care.     Therapist is recommending family involvement prior to discharge and it's importance. Patient declines.     CLINICAL MANUVERING/INTERVENTIONS:  Assisted Patient in processing session content; acknowledged and normalized Patient’s thoughts, feelings, and concerns by utilizing a person-centered approach in efforts to build appropriate rapport and a positive therapeutic relationship with open and honest communication. Allowed Patient to ventilate regarding current stressors and triggers for negative emotions and thoughts in a safe nonjudgmental environment with unconditional positive regard, active listening skills, and empathy.     ASSESSMENT: Patient is a 64 year old female who reported to the ED with SI. Patient states that she is currently living in subsidized housing. Patient has two prior admissions back in 2020.     Patient seen 1:1 in office. Patient states that she is doing well today. Patient reports that she has been struggling with her medications as she is not a fan of Buspar and how it makes her feel, but has struggled to stop taking this. Patient reports that she feels that she needed to come for her medication to be changed properly and get her feeling better. Patient reports that her current housing causes her stress and she would like to apply for new housing options, but understands how complicated and lengthy this process can be. Patient and therapist spoke about potential need for case management services. Patient reports that she has some family that she has communication with, but they are not supportive of her needs or mental health so she does not want to contact them about this. Patient denies  SI/HI/AVH.     PLAN: Patient will receive 24/7 nursing monitoring and daily psychiatrist evaluation by a multidisciplinary team.    Patient will continue stabilization at this time.

## 2025-06-04 NOTE — PLAN OF CARE
Goal Outcome Evaluation:  Plan of Care Reviewed With: patient  Plan of Care Reviewed With: patient  Patient Agreement with Plan of Care: agrees     Progress: improving  Outcome Evaluation: Pt cooperative with staff. Pt tearful during assessment am assessment. Pt did engage with peers after lunch remaining in group room and conversing. Pt reported difficulty staying asleep and fair appetite. Pt rates anxiety and depression both, 10/10. Pt denies SI, HI, and AVH.

## 2025-06-04 NOTE — PAYOR COMM NOTE
"Huey Parada (64 y.o. Female)       Date of Birth   1960    Social Security Number       Address   [address] [place] [address]    Home Phone   [phone]    MRN   [id number]       [facility]    Marital Status   Single                            Admission Date   6/3/2025    Admission Type   Emergency    Admitting Provider   Sherif Wilson MD    Attending Provider   Sherif Wilson MD    Department, Room/Bed   [facility] SENIOR PSYCHIATRIC, 1025/2S       Discharge Date       Discharge Disposition       Discharge Destination                                 Attending Provider: Sherif Wilson MD    Allergies: No Known Allergies    Isolation: None   Infection: None   Code Status: CPR    Ht: 167.6 cm (65.98\")   Wt: 54.1 kg (119 lb 4.8 oz)    Admission Cmt: None   Principal Problem: Suicidal ideations [R45.851]                   Active Insurance as of 6/3/2025       Primary Coverage       Payor Plan Insurance Group Employer/Plan Group    [employer] [place] [place]ICA[place]        Payor Plan Address Payor Plan Phone Number Payor Plan Fax Number Effective Dates    [address] [address] [phone]  7/3/2017 - None Entered    [facility] [id number]         Subscriber Name Subscriber Birth Date Member ID       HUEY PARADA 1960 [id number]                     Emergency Contacts        (Rel.) Home Phone Work Phone Mobile Phone    Ariela Tirado (Sister) [phone] -- --                          PLEASE ATTACH TO REFERENCE # [id number]               NOTIFICATION OF INPATIENT BEHAVIORAL HEALTH (PSYCHIATRIC) ADMISSION (REV CODE [id number])  ADMISSION DATE:  06/03/2025  Huey Parada     Adm Date: 6/3/2025 Adm Time: 2:27 PM   ADMITTED FROM THE EMERGENCY DEPARTMENT  FACILITY: [facility] (NPI [id number]) (TAX ID [id number])  ADDRESS: [address] [facility] [address]  ATTENDING MD: ARIANA LANGE (NPI [id number])  ADDRESS: [address] [facility] " "[id number]    NOTIFICATION SUBMITTED BY: HANS POLLOCK RN, U.R.  ONGOING U.R. CONTACT:   YOLANDA TORRES  PHONE [phone]  [phone]              H&P HAS NOT BEEN COMPLETED AT THIS TIME.             Chelly Conde, RN   Registered Nurse  Nursing     Nursing Note     Signed     Date of Service: 06/03/25 1242  Creation Time: 06/03/25 1242   Related encounter: ED from 6/3/2025 in [facility] EMERGENCY DEPARTMENT with Homer Rivera DO     Signed         Pt assessment complete      Pt brought to intake seeking a psychiatric evaluation      Pt states she has been feeling awful and that she has been trying to get off of her Buspar but she cannot stop it on her own. Pt also reports that her Provider prescribed her Paxil to assist with getting off of Buspar      Pt reports si with no plan   Pt denies hi but states that she has thoughts of hurting someone pt states when asking who this person is \"I won't say.\"   Pt denies avh   Excessive sleeping, poor appetite   Depression 10   Anxiety 10   Denies substance use   Pt reports stressors are living situation. She states she lives in an apartment alone                          Intake Information - 06/03/25 4557    What problem (s) brought you here today? Presented to ED reporting SI with no plan.  Denies any prior suicide attempts.  Reports that she was first prescribed buspar when she was admitted here in 2020.  Dr Blackburn currently prescribes.  She is wanting to stop taking buspar stating that she stays too drowsy while taking it.  She reports that her sleep has been excessive and appetite poor.  Also, reported to intake RN that she is having thoughts to hurt someone else, but would not disclose who this person is.  Denies HI.  She lives alone and drove herself to the hospital today.  She has never been  and has no children.  Plans to return home upon discharge.  Two prior admissions here in 2020.  Report given to JJ Anderson.     Contact Information - " 25 1233    Consent to contact given for the following No Consent Given     Medical/Surgical/Psychosocial History - 25 1233    Approximate date of last complete physical examination 25   Do you believe that you need HIV testing? No   Do you believe that you need Hepatitis C testing? No   Have you done anything to injure or harm yourself today? No   Previous Mental Health Treatment medication;outpatient treatment;inpatient treatment   Previous Substance Use Treatment none   Number of previous psychiatric admissions 1   Number of previous detox admissions 0     Family History - 25 1234    Marital status Single   Children? No   [place] raised [place]   Parent Marital Status other (see comments)   Location of parents    Number of siblings 1     Housing/Living Environment - 25 1234    Current Living Arrangements apartment   People in Home alone   How long have you lived with the others in your household? 25 years   Will your living arrangements affect your treatment/recovery? No   Do you need assistance with housing options? 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 denies   Will family be involved? No      - 25 1236    Has patient been in the ? No     Spiritual - 25 1236    Anabaptism denies   Are there spiritual concerns you feel need addressed during treatment? No   Spiritual care consult requested No     Sexual - 25 1236    Patient is in a monogamous relationship? No   Effect of drugs,  "alcohol or emotional problems on sexual behavior denies   Has patient ever been accused of inappropriate sexual behavior? No     Legal Issues - 06/03/25 1236    Has patient ever been arrested, charged or convicted of a crime? No   Does patient currently have any outstanding charges? No     Recreational - 06/03/25 1236    Recreational Hobbies   Sports Jogs/walks   Social/Family \"Hangs out\";Visiting   Solitary None   Patient tends to spend time With friends;With family   Patient's ability to be close to others has been affected by Emotional problems     Current Stressors - 06/03/25 1236    Current stressors Other (comment)   Current stressor details \" my living situation is not good.\"   Will stressors affect your treatment success or possibly cause relapse if chemically dependent? No     Screenings - 06/03/25 1239    Abuse Screen (yes response referral indicated)   Feels Unsafe at Home or Work/School no   Feels Threatened by Someone no   Does Anyone Try to Keep You From Having Contact with Others or Doing Things Outside Your Home? no   Physical Signs of Abuse Present no   AUDIT-C (Alcohol Use Disorders Identification Test)   Q1: How often do you have a drink containing alcohol? Monthly or l   Q2: How many drinks containing alcohol do you have on a typical day when you are drinking? 1 or 2   Q3: How often do you have six or more drinks on one occasion? Less than mo   Audit-C Score 2     Psychosocial Assessment - 06/03/25 1239    General Appearance WDL   General Appearance WDL WDL   Activity WDL   Activity WDL WDL   Daily Functioning WDL   Daily functioning WDL daily functioning   Daily functioning exceptions poor appetite;excessive sleeping   Speech WDL   Speech WDL X;speech   Speech soft/quiet   Attitude WDL   Attitude WDL attitude   Attitude exceptions little eye contact   Thought Process WDL   Thought Process WDL X;thought content   Thought Content suicidal thoughts;violent thoughts   Perceptual State WDL "   Perceptual State WDL WDL   Emotion Mood WDL   Emotion/Mood/Affect WDL X;emotion mood   Emotion/Mood anxious;depressed   Patient rated depression level 10   Patient rated anxiety level 10   Cognitive Function WDL   Cognitive function WDL WDL   Functional Status   Usual Activity Tolerance good     Safety - 06/03/25 1240    Is patient prone to violence? No   Does patient have a history of violent behavior? No   Has patient ever set fires or been accused of setting fires? No   Does patient have relatives working in the facility? No   Does patient know other patients in the facility? No   Release signed for notification? No   Does patient have any medical conditions/disabilities/limitations that would place them at greater risk during restraint or seclusion? No            Chelly Conde, RN   Registered Nurse  Nursing     Nursing Note     Addendum     Date of Service: 06/03/25 1317  Creation Time: 06/03/25 1317   Related encounter: ED from 6/3/2025 in [facility] EMERGENCY DEPARTMENT with Homer Rivera DO       Presented pt to DR. Wilson new order to admit sp3 routine orders. [id number]

## 2025-06-04 NOTE — H&P
INITIAL PSYCHIATRIC HISTORY & PHYSICAL    Patient Identification:  Name:  Tigist Bates  Age:  64 y.o.  Sex:  female  :  1960  MRN:  [id number]   Visit Number:  [id number]  Primary Care Physician:  Sher Blackburn DO SUBJECTIVE    CC/Focus of Exam: Depression, anxiety, SI, HI    HPI: Tigist Bates is a 64 y.o. female who was admitted on 6/3/2025 with complaints of having suicidal and homicidal ideations but she wouldn't describe any details and she states it was mostly out of anger and aggravation. She states she has been feeling worse for months. It started in January when her depression and anxiety started getting worse. She states she lives in subsidized housing and there are rules and she gets into trouble for not following the rules, for example she states she is not allowed to bring pop in and she is allowed to eat certain times of the day and then there is friend of her that aggravates her. She states she has been taking buspirone for the five years and it makes her feel calm but she wakes up feeling groggy and bad in the morning and doesn't like this feeling and had tried to stop it but she feels worse when she doesn't take it and she feels helpless. She was started on paroxetine in  to help her come off the buspirone but it makes her want to eat more and she stopped taking it after a few doses.     PAST PSYCHIATRIC HX: The patient has been in treatment for MDD and anxiety and has had two inpatient psych admissions in the past. She sees her pcp outpatient for depression and anxiety.     SUBSTANCE USE HX: Patient reports she used cigarettes, marijuana, alcohol and benzo starting in her teens. Smoked cigarettes for 16 years, marijuana for four years, alcohol for about 12 years (from age [age]), and benzos in her 20s and got addicted to Xanax and used it for 2-3 years. No detox or rehab treatment reported. She states she drank a beer in . Has not used thc and benzo since .       SOCIAL HX:   Social History     Socioeconomic History    Marital status: Single    Number of children: 0    Highest education level: Associate degree: academic program   Tobacco Use    Smoking status: Former     Passive exposure: Never    Smokeless tobacco: Never   Vaping Use    Vaping status: Never Used   Substance and Sexual Activity    Alcohol use: Not Currently    Drug use: Not Currently     Types: Benzodiazepines, Marijuana     Comment: denies    Sexual activity: Defer     Partners: Male         Past Medical History:   Diagnosis Date    Anxiety     Chronic pain     Depression     GERD (gastroesophageal reflux disease)     Suicidal thoughts           Past Surgical History:   Procedure Laterality Date    TONSILLECTOMY         Family History   Problem Relation Age of Onset    Ulcerative colitis Mother     Hypertension Father     Depression Sister     Breast cancer Neg Hx          Medications Prior to Admission   Medication Sig Dispense Refill Last Dose/Taking    busPIRone (BUSPAR) 5 MG tablet Take 1 tablet by mouth 2 (Two) Times a Day As Needed (anxiety).   6/2/2025    cetirizine (zyrTEC) 5 MG tablet Take 1 tablet by mouth Daily As Needed for Allergies.   Past Week         ALLERGIES:  Patient has no known allergies.    Temp:  [96.9 °F (36.1 °C)-98 °F (36.7 °C)] 96.9 °F (36.1 °C)  Heart Rate:  [66-77] 77  Resp:  [16-18] 18  BP: ()/(51-78) 131/68    REVIEW OF SYSTEMS:  Review of Systems   Constitutional: Negative.    HENT: Negative.     Eyes: Negative.    Respiratory: Negative.     Cardiovascular: Negative.    Gastrointestinal: Negative.    Endocrine: Negative.    Genitourinary: Negative.    Musculoskeletal: Negative.    Allergic/Immunologic: Negative.    Neurological: Negative.    Hematological: Negative.    Psychiatric/Behavioral:  Positive for dysphoric mood and suicidal ideas. The patient is nervous/anxious.         OBJECTIVE    PHYSICAL EXAM:  Physical Exam  Constitutional:  Appears well-developed and  well-nourished.   HENT:   Head: Normocephalic and atraumatic.   Right Ear: External ear normal.   Left Ear: External ear normal.   Mouth/Throat: Oropharynx is clear and moist.   Eyes: Pupils are equal, round, and reactive to light. Conjunctivae and EOM are normal.   Neck: Normal range of motion. Neck supple.   Cardiovascular: Normal rate, regular rhythm and normal heart sounds.    Respiratory: Effort normal and breath sounds normal. No respiratory distress. No wheezes.   GI: Soft. Bowel sounds are normal.No distension. There is no tenderness.   Musculoskeletal: Normal range of motion. No edema or deformity.   Neurological:  Cranial Nerves: I. No anosmia. II: No visual disturbance. III, IV VI: EOMI, PERRLA. V: Corneal reflext intact, no abnormal sensations. VII: No facial palsy, or altered sensation. VIII: Hearing intact, balance intact. IX: Intact ah reflex. X: Normal phonation, swallowing. XI: Normal shrug and head movement. XII: Intact tongue movements  Coordination normal. No lateralizing signs.  Skin: Skin is warm and dry. No rash noted. No erythema.     MENTAL STATUS EXAM:   Hygiene:   fair  Cooperation:  Cooperative  Eye Contact:  Fair  Psychomotor Behavior:  Appropriate  Affect:  Restricted  Hopelessness: 5  Speech:  Normal  Thought Progress: Goal directed  Thought Content:  Normal  Suicidal:  Suicidal Ideation  Homicidal:  HI Homicidal Ideation  Hallucinations:  None  Delusion:  None  Memory:  Intact  Orientation:  Person, Place, Time, and Situation  Reliability:  fair  Insight:  Poor  Judgement:  Poor  Impulse Control:  Fair    Imaging Results (Last 24 Hours)       ** No results found for the last 24 hours. **             ECG/EMG Results (most recent)       None             Lab Results   Component Value Date    GLUCOSE 84 06/03/2025    BUN 16.0 06/03/2025    CREATININE 0.77 06/03/2025    EGFRIFNONA 68 04/07/2020    BCR 20.8 06/03/2025    CO2 24.8 06/03/2025    CALCIUM 9.2 06/03/2025    ALBUMIN 4.3  06/03/2025    AST 19 06/03/2025    ALT 8 06/03/2025       Lab Results   Component Value Date    WBC 4.32 06/03/2025    HGB 12.3 06/03/2025    HCT 39.0 06/03/2025    MCV 93.3 06/03/2025     06/03/2025       Last Urine Toxicity  More data exists         Latest Ref Rng & Units 6/3/2025 4/7/2020   LAST URINE TOXICITY RESULTS   Amphetamine, Urine Qual Negative Negative  Negative    Barbiturates Screen, Urine Negative Negative  Negative    Benzodiazepine Screen, Urine Negative Negative  Negative    Buprenorphine, Screen, Urine Negative Negative  Negative    Cocaine Screen, Urine Negative Negative  Negative    Fentanyl, Urine Negative Negative  -   Methadone Screen , Urine Negative Negative  Negative    Methamphetamine, Ur Negative Negative  -       Brief Urine Lab Results  (Last result in the past 365 days)        Color   Clarity   Blood   Leuk Est   Nitrite   Protein   CREAT   Urine HCG        06/03/25 1226 Yellow   Clear   Negative   Small (1+)   Negative   Negative                   DATA  Labs reviewed. CBC, CMP, UA and UDS negative.   EKG reviewed. QTc 424 ms. CHOE reviewed.   Record reviewed. The patient was last here in April 2020 for MDD.     Strengths: Motivated for treatment    Weaknesses:Poor coping skills    Code status:  Full  Discussed code status with patient.    ASSESSMENT & PLAN:    Hospital bed: Yes patient is fall risk.      Suicidal ideations  -SP 3      Homicidal ideations  -Patient not willing to identify anyone in particular she wants to harm      Other recurrent depressive disorders  -Start escitalopram 5 mg daily      Anxiety disorder unspecified  -Escitalopram as above  -Stop buspirone    The patient has been admitted for safety and stabilization.  Patient will be monitored for suicidality daily and maintained on Special Precautions Level 3 (q15 min checks) .  The patient will have individual and group therapy with a master's level therapist. A master treatment plan will be developed  and agreed upon by the patient and his/her treatment team.  The patient's estimated length of stay in the hospital is 5-7 days.

## 2025-06-04 NOTE — PLAN OF CARE
Goal Outcome Evaluation:  Plan of Care Reviewed With: patient  Plan of Care Reviewed With: patient  Patient Agreement with Plan of Care: agrees     Progress: no change  Outcome Evaluation: Patient rates anxiety 8/10 depression 10/10 Denies SI/HI/AVH reports feelings of sadness Isolates in room without interacting socially with peers. Calm/cooperative with staff AOX3 She went to bed earlier than most other patients on floor this shift No other issues or complaints voiced

## 2025-06-05 PROCEDURE — 99232 SBSQ HOSP IP/OBS MODERATE 35: CPT | Performed by: PSYCHIATRY & NEUROLOGY

## 2025-06-05 PROCEDURE — 63710000001 ONDANSETRON ODT 4 MG TABLET DISPERSIBLE: Performed by: PSYCHIATRY & NEUROLOGY

## 2025-06-05 RX ADMIN — Medication 2.5 MG: at 21:22

## 2025-06-05 RX ADMIN — ESCITALOPRAM OXALATE 5 MG: 10 TABLET ORAL at 10:23

## 2025-06-05 RX ADMIN — ONDANSETRON 4 MG: 4 TABLET, ORALLY DISINTEGRATING ORAL at 07:22

## 2025-06-05 RX ADMIN — SERTRALINE HYDROCHLORIDE 25 MG: 50 TABLET ORAL at 15:03

## 2025-06-05 NOTE — PROGRESS NOTES
"INPATIENT PSYCHIATRIC PROGRESS NOTE    Name:  Tigist Bates  :  1960  MRN:  [id number]  Visit Number:  [id number]  Length of stay:  2    SUBJECTIVE    CC/Focus of Exam: depression and anxiety    INTERVAL HISTORY:  The patient reports she is not feeling good as the medication (escitalopram) is causing nausea and headaches and she wants to stop taking it. She reports in the past Zoloft worked for her and she agreed to switch to Zoloft.   Depression rating 10/10  Anxiety rating 10/10  Sleep:good  Withdrawal sx: Shaking, nervousness from coming off of Buspar, but it is getting better.   Cravin/10    Review of Systems   Respiratory: Negative.     Neurological:  Positive for tremors and weakness.   Psychiatric/Behavioral:  Positive for dysphoric mood. The patient is nervous/anxious.        OBJECTIVE    Temp:  [96.8 °F (36 °C)-97.2 °F (36.2 °C)] 97.2 °F (36.2 °C)  Heart Rate:  [68-87] 87  Resp:  [16-20] 20  BP: (124-154)/(72-80) 145/78    MENTAL STATUS EXAM:  Appearance:Casually dressed, good hygeine.   Cooperation:Cooperative  Psychomotor: No psychomotor agitation/retardation, No EPS, No motor tics  Speech-normal rate, amount.  Mood \"depressed\"   Affect-congruent, appropriate, stable  Thought Content-goal directed, no delusional material present  Thought process-linear, organized.  Suicidality: No SI  Homicidality: No HI  Perception: No AH/VH  Insight-fair   Judgement-fair    Lab Results (last 24 hours)       ** No results found for the last 24 hours. **               Imaging Results (Last 24 Hours)       ** No results found for the last 24 hours. **               ECG/EMG Results (most recent)       None             ALLERGIES: Patient has no known allergies.    Medication Review:   Scheduled Medications:  escitalopram, 5 mg, Oral, Daily         PRN Medications:    acetaminophen    aluminum-magnesium hydroxide-simethicone    benzonatate    bisacodyl    cetirizine    ibuprofen    loperamide    magnesium " hydroxide    melatonin    ondansetron ODT    sodium chloride    traZODone   All medications reviewed.    ASSESSMENT & PLAN:      Suicidal ideations  -SP 3       Homicidal ideations  -Patient denies today       Other recurrent depressive disorders  -Start sertraline 25 mg po daily  -Stop escitalopram 5 mg daily       Anxiety disorder unspecified  -Sertraline as above  -Stopped buspirone    Special precautions: Special Precautions Level 3 (q15 min checks) .    Behavioral Health Treatment Plan and Problem List: I have reviewed and approved the Behavioral Health Treatment Plan and Problem list.  The patient has had a chance to review and agrees with the treatment plan.    Copied text in portions of this note has been reviewed and is accurate as of 06/05/25         Clinician:  Saroj Law MD  06/05/25  14:11 EDT

## 2025-06-05 NOTE — PLAN OF CARE
Goal Outcome Evaluation:  Plan of Care Reviewed With: patient  Plan of Care Reviewed With: patient  Patient Agreement with Plan of Care: agrees     Progress: improving  Outcome Evaluation: Pt reports fair sleep and a good appetite. Rates A/D 6/8. Denies SI/HI or hallucinations. Denies feeling helpless, hopeless or worthless. Pt has slept about 7.5 hours this shift.

## 2025-06-05 NOTE — PLAN OF CARE
Goal Outcome Evaluation:  Plan of Care Reviewed With: patient  Plan of Care Reviewed With: patient  Patient Agreement with Plan of Care: agrees     Progress: no change  Outcome Evaluation: AT BEGINNING OF SHIFT PATIENT WAS COMPLAINING OF FEELING FATIGUED AND NAUSEATED FROM NEW MEDICATION STARTED YESTERDAY.  PATIENT RECEIVED ZOFRAN AND ATE 50% OF BREAKFAST.  HAS BEEN IN BED MOST OF SHIFT.

## 2025-06-05 NOTE — DISCHARGE INSTR - APPOINTMENTS
June 16 @8 kali- Mily    [facility] [place]   [address] [place] [id number]  [phone]    Mention interest in case management services to your therapist.

## 2025-06-05 NOTE — PLAN OF CARE
Goal Outcome Evaluation:           Progress: improving  Outcome Evaluation: Patient and therapist met to review care plan, social history, aftercare recommendation, and disposition plan; patient agreeable.           Problem: Adult Behavioral Health Plan of Care  Goal: Patient-Specific Goal (Individualization)  Outcome: Progressing  Flowsheets  Taken 6/4/2025 1542 by Nik Adkins  Patient/Family-Specific Goals (Include Timeframe): Identify 2-3 coping skills, complete safety plan, complete aftercare plan, and deny SI/HI within 1-7 days.  Individualized Care Needs: Therapist to offer 1-4 therapy sessions, aftercare planning, safety planning, daily groups, and brief CBT/MI interventions.  Taken 6/4/2025 1530 by Nik Adkins  Patient Personal Strengths:   resilient   resourceful   self-reliant   motivated for treatment   expressive of needs   expressive of emotions  Patient Vulnerabilities:   lacks insight into illness   poor impulse control   limited support system  Taken 6/3/2025 1518 by Deidre George RN  Anxieties, Fears or Concerns: None verbalized  Goal: Optimized Coping Skills in Response to Life Stressors  Outcome: Progressing  Intervention: Promote Effective Coping Strategies  Flowsheets (Taken 6/5/2025 1422)  Supportive Measures:   active listening utilized   counseling provided   decision-making supported   positive reinforcement provided   mindfulness techniques promoted   self-reflection promoted   self-responsibility promoted   verbalization of feelings encouraged  Goal: Develops/Participates in Therapeutic [place] to Support Successful Transition  Outcome: Progressing  Intervention: Foster Therapeutic [place]  Flowsheets (Taken 6/5/2025 1422)  Trust Relationship/Rapport:   care explained   choices provided   reassurance provided   thoughts/feelings acknowledged   emotional support provided   empathic listening provided   questions answered   questions encouraged  Intervention: Mutually Develop  Transition Plan  Flowsheets  Taken 6/4/2025 1542 by Nik Adkins  Outpatient/Agency/Support Group Needs:   outpatient counseling   outpatient medication management  Discharge Coordination/Progress:   Therapist and patient met on this date to complete discharge needs assessment   patient considering outpatient services.  Transition Support:   crisis management plan promoted   crisis management plan verbalized   follow-up care discussed  Anticipated Discharge Disposition: home or self-care  Current Discharge Risk: lives alone  Concerns to be Addressed:   medication   mental health  Offered/Gave Vendor List: no  Taken 6/4/2025 1541 by Nik Adkins  Readmission Within the Last 30 Days: no previous admission in last 30 days  Taken 6/3/2025 1518 by Deidre George, RN  Transportation Anticipated: car, drives self  Patient/Family Anticipated Services at Transition:   mental health services   outpatient care  Patient/Family Anticipates Transition to: home     DATA:Therapist met with Patient individually this date. Patient agreeable to discuss current treatment progress and discharge concerns.     CLINICAL MANUVERING/INTERVENTIONS:  Assisted Patient in processing session content; acknowledged and normalized Patient’s thoughts, feelings, and concerns by utilizing a person-centered approach in efforts to build appropriate rapport and a positive therapeutic relationship with open and honest communication. Allowed Patient to ventilate regarding current stressors and triggers for negative emotions and thoughts in a safe nonjudgmental environment with unconditional positive regard, active listening skills, and empathy.      ASSESSMENT: Patient seen 1:1 for follow up. Patient reports that she is feeling rough today as she does not like her current medication. Patient states that this medication makes her feel bad and that she has been in bed most of the day. Patient reports that she is otherwise feeling better with some  improvement in her depression and anxiety. Patient states that she is interested in case management services and provided consent for [facility] in [place]. Patient again denies any family contact. Patient denies SI/HI/AVH.     PLAN: Patient will continue stabilization. Patient will continue to receive services offered by Treatment Team.     Patient will follow-up with [facility].

## 2025-06-06 LAB — TSH SERPL DL<=0.05 MIU/L-ACNC: 4.03 UIU/ML (ref 0.27–4.2)

## 2025-06-06 PROCEDURE — 99232 SBSQ HOSP IP/OBS MODERATE 35: CPT | Performed by: PSYCHIATRY & NEUROLOGY

## 2025-06-06 RX ADMIN — SERTRALINE HYDROCHLORIDE 25 MG: 50 TABLET ORAL at 08:19

## 2025-06-06 RX ADMIN — Medication 2.5 MG: at 20:55

## 2025-06-06 NOTE — PROGRESS NOTES
"INPATIENT PSYCHIATRIC PROGRESS NOTE    Name:  Tigist Bates  :  1960  MRN:  [id number]  Visit Number:  [id number]  Length of stay:  3    SUBJECTIVE    CC/Focus of Exam: SI, HI, concern for psychosis/confusion    INTERVAL HISTORY:  First time seeing patient.  Chart, notes, vitals, labs and EKG personally reviewed.    Patient reports she is not feeling good today.  She intermittently appears to be confused or disorganized, occasionally making odd statements or connections in her conversations.  She reports she has been \"trying to get off BuSpar for years,\" often repeating this throughout the conversation randomly and at inappropriate times.  She reports feeling different after her Celexa was increased, although she was just recently changed from paroxetine to sertraline.  Occasionally drifts in conversation, talking about her apartment, rules there, reporting she does not like it there because she cannot have food as often as she wants to and she has to \"stand too much.\"  Unable to explain much beyond that.  Patient repeats several comments throughout the conversation.  Possibility of mild psychosis and/or confusion.    Depression rating 7/10  Anxiety rating 7/10  Sleep: Fair  Withdrawal sx: Mild tremors, anxiety  Cravin/10    Review of Systems   Constitutional: Negative.    Respiratory: Negative.     Cardiovascular: Negative.    Gastrointestinal: Negative.    Musculoskeletal: Negative.    Neurological:  Positive for tremors.   Psychiatric/Behavioral:  Positive for dysphoric mood. The patient is nervous/anxious.        OBJECTIVE    Temp:  [97.7 °F (36.5 °C)-97.9 °F (36.6 °C)] 97.9 °F (36.6 °C)  Heart Rate:  [76-82] 79  Resp:  [16] 16  BP: (124-134)/(68-74) 134/74    MENTAL STATUS EXAM:  Appearance: Casually dressed, good hygeine.   Cooperation: Cooperative  Psychomotor: Mild psychomotor retardation, No EPS, No motor tics  Speech: Mildly slowed rate, amount.  Often drifts in conversation, struggles to " "maintain her line of thinking at times.  Mood: \"Not too good today\"   Affect: congruent, anxious  Thought Content: possible delusional material present, potentially more confused or disorganized  Thought process: Possible disorganization, occasionally tangential  Suicidality: Improving SI  Homicidality: Denied HI  Perception: Denied AH/VH  Insight: Questionable  Judgment: fair    Lab Results (last 24 hours)       ** No results found for the last 24 hours. **               Imaging Results (Last 24 Hours)       ** No results found for the last 24 hours. **               ECG/EMG Results (most recent)       None             ALLERGIES: Patient has no known allergies.      Current Facility-Administered Medications:     acetaminophen (TYLENOL) tablet 650 mg, 650 mg, Oral, Q6H PRN, Sherif Wilson MD, 650 mg at 06/04/25 0819    aluminum-magnesium hydroxide-simethicone (MAALOX MAX) 400-400-40 MG/5ML suspension 15 mL, 15 mL, Oral, Q6H PRN, Sherif Wilson MD    benzonatate (TESSALON) capsule 100 mg, 100 mg, Oral, TID PRN, Sherif Wilson MD    bisacodyl (DULCOLAX) EC tablet 5 mg, 5 mg, Oral, Daily PRN, Sherif Wilson MD    cetirizine (zyrTEC) tablet 5 mg, 5 mg, Oral, Daily PRN, Sherif Wilson MD    ibuprofen (ADVIL,MOTRIN) tablet 400 mg, 400 mg, Oral, Q6H PRN, Sherif Wilson MD    loperamide (IMODIUM) capsule 2 mg, 2 mg, Oral, Q2H PRN, Sherif Wilson MD    magnesium hydroxide (MILK OF MAGNESIA) suspension 10 mL, 10 mL, Oral, Daily PRN, Sherif Wilson MD, 10 mL at 06/04/25 2111    melatonin tablet 2.5 mg, 2.5 mg, Oral, Nightly PRN, Sherif Wilson MD, 2.5 mg at 06/05/25 2122    ondansetron ODT (ZOFRAN-ODT) disintegrating tablet 4 mg, 4 mg, Oral, Q6H PRN, Sherif Wilson MD, 4 mg at 06/05/25 0722    sertraline (ZOLOFT) tablet 25 mg, 25 mg, Oral, Daily, Saroj Law MD, 25 mg at 06/06/25 0819    sodium chloride nasal spray 2 spray, 2 spray, Each Anande, DANIS, Sherif Wilson, " MD    Reviewed chart, notes, vitals, labs and EKG personally reviewed.    ASSESSMENT & PLAN:    Suicidal ideations  -SP 3       Homicidal ideations  -Patient denies today       Other recurrent depressive disorders  -Started sertraline 25 mg po daily on 6/5/2025  -Stopped escitalopram 5 mg daily  -We will establish outpatient psychiatric care following hospitalization       Anxiety disorder unspecified  -Sertraline as above  -Stopped buspirone    Confusion  - Patient appears more confused, tangential than delusional but difficult to say at this point  - We will obtain dementia labs and complete slums    Special precautions: Special Precautions Level 3 (q15 min checks)     Behavioral Health Treatment Plan and Problem List: I have reviewed and approved the Behavioral Health Treatment Plan and Problem list.  The patient has had a chance to review and agrees with the treatment plan.    I have reviewed the copied text and it is accurate as of 06/06/25     Clinician:  Arjun Munoz MD  06/06/25  10:47 EDT

## 2025-06-06 NOTE — PLAN OF CARE
Goal Outcome Evaluation:  Plan of Care Reviewed With: patient  Plan of Care Reviewed With: patient  Patient Agreement with Plan of Care: agrees     Progress: no change  Outcome Evaluation: Pt reports fair sleep and fair appetite. Rates A/D 9/10. Denies SI/HI or hallucinations. Reports feeling helpless, hopeless and worthless. Pt has slept about 8.5 hours this shift.

## 2025-06-06 NOTE — PLAN OF CARE
Problem: Adult Behavioral Health Plan of Care  Goal: Patient-Specific Goal (Individualization)  Outcome: Progressing  Flowsheets  Taken 6/4/2025 1542 by Nik Adkins  Patient/Family-Specific Goals (Include Timeframe): Identify 2-3 coping skills, complete safety plan, complete aftercare plan, and deny SI/HI within 1-7 days.  Individualized Care Needs: Therapist to offer 1-4 therapy sessions, aftercare planning, safety planning, daily groups, and brief CBT/MI interventions.  Taken 6/4/2025 1530 by Nik Adkins  Patient Personal Strengths:   resilient   resourceful   self-reliant   motivated for treatment   expressive of needs   expressive of emotions  Patient Vulnerabilities:   lacks insight into illness   poor impulse control   limited support system  Taken 6/3/2025 1518 by Deidre George RN  Anxieties, Fears or Concerns: None verbalized  Goal: Optimized Coping Skills in Response to Life Stressors  Outcome: Progressing  Intervention: Promote Effective Coping Strategies  Flowsheets (Taken 6/6/2025 1358)  Supportive Measures:   active listening utilized   self-reflection promoted   counseling provided   positive reinforcement provided   self-responsibility promoted   decision-making supported   goal-setting facilitated   problem-solving facilitated   verbalization of feelings encouraged   self-care encouraged  Goal: Develops/Participates in Therapeutic [place] to Support Successful Transition  Outcome: Progressing  Intervention: Foster Therapeutic [place]  Flowsheets (Taken 6/6/2025 1358)  Trust Relationship/Rapport:   care explained   reassurance provided   choices provided   thoughts/feelings acknowledged   emotional support provided   empathic listening provided   questions answered   questions encouraged  Intervention: Mutually Develop Transition Plan  Flowsheets  Taken 6/6/2025 1358  Transition Support:   community resources reviewed   crisis management plan promoted   follow-up care discussed    follow-up care coordinated   crisis management plan verbalized  Taken 6/6/2025 1354  Discharge Coordination/Progress: Patient has [employer] insurance, reports no issues with transportation and is scheduled with [facility]  Patient's Choice of Community Agency(s): [facility]  Taken 6/6/2025 1356  Discharge Coordination/Progress: Patient has [employer] insurance reports no issues with transport and  Transportation Anticipated: car, drives self  Transportation Concerns: none  Current Discharge Risk:   lives alone   psychiatric illness  Concerns to be Addressed:   medication   mental health  Readmission Within the Last 30 Days: no previous admission in last 30 days  Patient/Family Anticipated Services at Transition:   mental health services   outpatient care  Patient/Family Anticipates Transition to: home  Offered/Gave Vendor List: no      Data:  Therapist reviewed Dr. Munoz's assessment, discussed patient with nursing staff and met with patient this date to further discuss patient progress, review healthy coping and safe disposition.      Clinical Maneuvering/Intervention:    Therapist assisted patient in processing session content; acknowledged and normalized patient's thoughts, feelings and concerns.  Encouraged patient to discuss/vent feelings, frustrations, and fears concerning their ongoing issues and validated patients feelings.  Discussed the importance of healthy coping and reviewed healthy coping skills such as distraction, and social support.  Reviewed safe disposition with patient.    Assessment:  Patient denies suicidal ideation/homicidal ideation.  Patient reports ongoing depression and anxiety today.  Patient states she is struggling with some brain fog.  She discussed that she does not like her apartment and struggled to explain why.  She reported that she has good neighbors.  She reports that she does come visit her sister some in [place] on occasion.  She reports that her sister is disabled due to several health  issues but her sister was a nurse.  She discussed that she did stay with her sister some in the past and helped to care for her when she was having surgery.  Encouraged patient to consider spending time again with her sister.  She discussed the need to engage in self care and do her laundry.    Plan:  Patient will continue hospitalization/medication management. Patient will return home upon stabilization.  Patient will engage in aftercare with [facility].

## 2025-06-06 NOTE — PLAN OF CARE
Goal Outcome Evaluation:  Plan of Care Reviewed With: patient  Plan of Care Reviewed With: patient  Patient Agreement with Plan of Care: agrees     Progress: no change  Outcome Evaluation: PATIENT HAS BEEN CONFUSED SOME TODAY, KEEPS ASKING WHEN SHE CAN LEAVE TODAY.  HAS BEEN IN HER ROOM MOST OF THE SHIFT.  EATING GOOD & COOPERATIVE.

## 2025-06-07 LAB
25(OH)D3 SERPL-MCNC: 9.6 NG/ML (ref 30–100)
FOLATE SERPL-MCNC: 6.62 NG/ML (ref 4.78–24.2)
HIV 1+2 AB+HIV1 P24 AG SERPL QL IA: NORMAL
RPR SER QL: NORMAL
VIT B12 BLD-MCNC: 237 PG/ML (ref 211–946)

## 2025-06-07 PROCEDURE — 82607 VITAMIN B-12: CPT | Performed by: PSYCHIATRY & NEUROLOGY

## 2025-06-07 PROCEDURE — 99232 SBSQ HOSP IP/OBS MODERATE 35: CPT | Performed by: PSYCHIATRY & NEUROLOGY

## 2025-06-07 PROCEDURE — G0432 EIA HIV-1/HIV-2 SCREEN: HCPCS | Performed by: PSYCHIATRY & NEUROLOGY

## 2025-06-07 PROCEDURE — 82746 ASSAY OF FOLIC ACID SERUM: CPT | Performed by: PSYCHIATRY & NEUROLOGY

## 2025-06-07 PROCEDURE — 82306 VITAMIN D 25 HYDROXY: CPT | Performed by: PSYCHIATRY & NEUROLOGY

## 2025-06-07 PROCEDURE — 86592 SYPHILIS TEST NON-TREP QUAL: CPT | Performed by: PSYCHIATRY & NEUROLOGY

## 2025-06-07 RX ADMIN — ACETAMINOPHEN 650 MG: 325 TABLET ORAL at 08:28

## 2025-06-07 RX ADMIN — Medication 2.5 MG: at 20:59

## 2025-06-07 RX ADMIN — SERTRALINE HYDROCHLORIDE 25 MG: 50 TABLET ORAL at 08:28

## 2025-06-07 NOTE — PLAN OF CARE
Doing well. Denies vb, lof, ctx. Good FM. States yesterday she hadmultiple bouts of diarrhea. 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 No f/c/n/v. Tolerating po well now. She takes 26N in morning, 12L lunchtime, 12N/12L pm. Pt taking logs -  Fasting 101, 103, 138, 136, 80, 101, 106, 73, 178, 134  Breakfast 63, 114, 91, 101, 73, 107, 61, 140, 121, 180, 94  Lunch 86, 61, 104, 83, 90  Dinner 58, 81, 188, 164, 170, 113, 129, 137, 190    Random accuchek 105 today.    Vitals:    19 1402   BP: 117/72   Pulse: 89   Resp: 18   Temp: 98.5 °F (36.9 °C)     NAD Comfortable  RRR CTAB  Abd soft nontender c/w dates. No rebound/gaurding  FH 29cm    Ext nontender, no erythema or edema    A/P: 35yo  @30+3 by 9wk US  1) PNC: up to date  - O+, Ab neg, Hgb 11.6, HIV NR/Hep B neg/RPR nr/Rb imm/VZV imm, Pap NILM , GCCT negative   -Quant gold negative, CF neg  - s/p flu vaccine  - s/p genetic counseling , nml NT, FTS neg  - Elevated msAFP - see below  -SMA carrier, FOB tested by PCP and is NOT an SMA carrier (results in Epic - FOB = Dustin Brand). No further testing required.  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  - s/p admission 3/17- [facility] for poor diabetic control.  - INSULIN REGIMEN (after dc from hospital 3/20):  AM 26N/12L, PM 12N/12L. She is taking 12L with lunch instead of breakfast as prescribed. Based on glucose levels it does not appear she is requiring any short acting with breakfast.  Change insulin reigmen to: 26N in morning, 14N/14L in evening. Discussed diet and exercise. Discussed importance of eating consistent /regular meals and taking blood glucose levels at the appropriate times.   -seen by Optho 2018: b/l mild nonproliferative retinopathy and vitreal  Goal Outcome Evaluation:  Plan of Care Reviewed With: patient  Plan of Care Reviewed With: patient  Patient Agreement with Plan of Care: agrees     Progress: no change  Outcome Evaluation: PATIENT FEELING MUCH BETTER TODAY.  EATING GOOD, HAS BEEN OUT OF ROOM SOME TODAY WATCHING TV.  COOPERTIVE & EATING GOOD.                              floaters-repeat visit re-scheduled  - Hgb A1C was 9.3 (18) > 7.8 (10/24), 5.3% ()> 5.6 (3/18)  -TSH 2.301  - Baseline PreE labs () Hgb 11.6, Plt 353, Cr 0.53, mildly elevated AST/ALT 39/82 (see below), 24hr urine () unable to calculate, PCR unable to calc  -Echo wnl EF 55-60% () and EKG NSR ()  - s/p nl Fetal echo  - ASA 162mg- compliant  - 2x Weekly NST/BONNIE starting 32wk  - Serial growths - +6: 1264g, 71%ile, BONNIE 18.3, post placenta w/o previa, vtx, next   3) Elevated msAFP  - AFP  2.09 MoM (1:394 risk of neural tube defect or abdominal wall defect)  > decreased to 1: 3450 after nl anatomy scan  - s/p genetic counseling  - Declined amniocentesis  - Continue serial growths, ANT  - Reviewed unexplained msAFP - increased risk preeclampsia,  labor, placental abruption  4) Mildly elevated LFTs, now resolved  -noted on baseline preE labs on : AST/ALT 39/82 --> on repeat () 35/81 > 12/10: AST/ALT 20/36  - Liver US ordered- unremarkable liver. Small gallbladder sludge. No stones, CBD dilatation, or gallbladder wall thickening.  5) BMI=31  -admits to snoring - sleep study referral ordered- will assist patient with getting appointment   -TWG goal 10-15#  -Serial growths and ANT as above  6) Post partum -  - desires breastfeeding  - contraception - unsure, reviewed [email or web address]    Reviewed kick counts, PTL precautions, call MD if blood glucose levels are >180 persistently.  RTC 1-2 weeks  Dasha Gaines MD  OBGYN PGY-2

## 2025-06-07 NOTE — PLAN OF CARE
"Goal Outcome Evaluation:  Plan of Care Reviewed With: patient  Plan of Care Reviewed With: patient  Patient Agreement with Plan of Care: agrees     Progress: improving  Outcome Evaluation: Patient was calm and cooperative during assessment.  She reported that she is sleeping well and has a good appetite.  She rated her anxiety 10/10 and her depression as 8/10.  She denied any SI/HI/AVH. She is alert and oriented and spent some time in the day room during the overnight shift. She indicated that she no longer wants to take Buspar and reports feeling 'blah\" and is hopeful new meds will help her not feel this way.                             "

## 2025-06-07 NOTE — PROGRESS NOTES
"      Inpatient Psych Progress Note     Clinician: Gab Cloud MD  Admission Date: 6/3/2025  07:23 EDT 06/07/25    Behavioral Health Treatment Plan and Problem List: I have reviewed and approved the Behavioral Health Treatment Plan and Problem list.    Allergies  No Known Allergies    Hospital Day: 4 days      Assessment completed within view of staff    History  CC/clinical focus: SI, HI, concern for psychosis/confusion     Interval HPI: Patient seen and evaluated by me.  Chart reviewed.  Pt more oriented today, mildly disorganized at times. She reports no new problems overnight. She does endorse bilateral leg pain over the last week or so and states that this may be from standing. She has subjectively rated anxiety 10/10 and depression 8/10 to nursing staff. She denies any SI/HI/AVH at this time. Seems to be tolerating Zoloft ok thus far.   Med Compliant.  ROS otherwise as below.    Review of Systems   Constitutional: Negative.    HENT: Negative.     Eyes: Negative.    Respiratory: Negative.     Cardiovascular: Negative.    Gastrointestinal: Negative.    Endocrine: Negative.    Genitourinary: Negative.    Musculoskeletal:  Positive for myalgias.   Skin: Negative.    Allergic/Immunologic: Negative.    Neurological: Negative.    Hematological: Negative.    Psychiatric/Behavioral:  Positive for confusion. The patient is nervous/anxious.         /75 (BP Location: Right arm, Patient Position: Standing)   Pulse 80   Temp 98.1 °F (36.7 °C) (Temporal)   Resp 20   Ht 167.6 cm (65.98\")   Wt 54.1 kg (119 lb 4.8 oz)   LMP  (LMP Unknown)   SpO2 97%   BMI 19.26 kg/m²     Mental Status Exam  Mood: dysphoric  Affect: mood-congruent   Thought Processes: mildly disorganized  Thought Content: normal  Hallucinations: no  Suicidal Thoughts: denies  Suicidal Plan/Intent: denies  Hopelesness:Moderate  Homicidal Thoughts:  denies      Medical Decision Making:   Labs:     Lab Results (last 24 hours)       Procedure " Component Value Units Date/Time    HIV-1 & HIV-2 Antibodies [[id number]]  (Normal) Collected: 06/07/25 0253    Specimen: Blood Updated: 06/07/25 0425    Narrative:      The following orders were created for panel order HIV-1 & HIV-2 Antibodies.  Procedure                               Abnormality         Status                     ---------                               -----------         ------                     HIV-1 / O / 2 Ag / Antibody[[id number]]  Normal              Final result                 Please view results for these tests on the individual orders.    HIV-1 / O / 2 Ag / Antibody [[id number]]  (Normal) Collected: 06/07/25 0253    Specimen: Blood Updated: 06/07/25 0425     HIV DUO Non-Reactive    Narrative:      The HIV antibody/antigen combo assay is a qualitative assay for HIV that includes the p24 antigen as well as antibodies to HIV types 1 and 2. This test is intended to be used as a screening assay in the diagnosis of HIV infection in patients over the age of 2.    Vitamin D,25-Hydroxy [[id number]] Collected: 06/07/25 0253    Specimen: Blood Updated: 06/07/25 0348    Vitamin B12 [[id number]] Collected: 06/07/25 0253    Specimen: Blood Updated: 06/07/25 0348    Folate [[id number]] Collected: 06/07/25 0253    Specimen: Blood Updated: 06/07/25 0348    RPR Qualitative with Reflex to Quant [[id number]] Collected: 06/07/25 0253    Specimen: Blood Updated: 06/07/25 0348    TSH [[id number]]  (Normal) Collected: 06/04/25 0542    Specimen: Blood Updated: 06/06/25 1231     TSH 4.030 uIU/mL               Radiology:     Imaging Results (Last 24 Hours)       ** No results found for the last 24 hours. **              EKG:     ECG/EMG Results (most recent)       None             Medications:  sertraline, 25 mg, Oral, Daily           All medications reviewed.      Assessment and Plan:      Suicidal ideations  - Admitted for safety and stabilization   - SP 3     Homicidal ideations  - Patient denies today     Other  recurrent depressive disorders  - Started sertraline 25 mg po daily on 6/5/2025  - Stopped escitalopram 5 mg daily  - We will establish outpatient psychiatric care following hospitalization     Anxiety disorder unspecified  - Sertraline as above  - Stopped buspirone     Confusion  - SLUMS score of 16 raising concern for an underlying dementia   - Lab workup thus far unrevealing including HIV neg and TSH within normal limits  - RPR, folate and B12 pending     Continue hospitalization for safety and stabilization.  Continue current level of Special Precautions (q15 minute checks).        This note was generated by a scribeStalin. The work documented in this note was completed, reviewed, and approved by the attending psychiatrist as designated Dr. Gab Cloud electronic signature.     I, Gab Cloud MD, personally performed the services described in this documentation as scribed by the above named individual and is both accurate and complete as of 6/7/2025.

## 2025-06-08 PROCEDURE — 99232 SBSQ HOSP IP/OBS MODERATE 35: CPT | Performed by: PSYCHIATRY & NEUROLOGY

## 2025-06-08 RX ORDER — HYDROXYZINE HYDROCHLORIDE 10 MG/1
10 TABLET, FILM COATED ORAL EVERY 6 HOURS PRN
Status: DISCONTINUED | OUTPATIENT
Start: 2025-06-08 | End: 2025-06-10 | Stop reason: HOSPADM

## 2025-06-08 RX ADMIN — SERTRALINE HYDROCHLORIDE 25 MG: 50 TABLET ORAL at 08:43

## 2025-06-08 RX ADMIN — Medication 5000 UNITS: at 14:26

## 2025-06-08 RX ADMIN — Medication 2.5 MG: at 20:26

## 2025-06-08 RX ADMIN — ACETAMINOPHEN 650 MG: 325 TABLET ORAL at 10:36

## 2025-06-08 RX ADMIN — HYDROXYZINE HYDROCHLORIDE 10 MG: 10 TABLET ORAL at 18:23

## 2025-06-08 NOTE — PROGRESS NOTES
"      Inpatient Psych Progress Note     Clinician: Gab Cloud MD  Admission Date: 6/3/2025  07:19 EDT 06/08/25    Behavioral Health Treatment Plan and Problem List: I have reviewed and approved the Behavioral Health Treatment Plan and Problem list.    Allergies  No Known Allergies    Hospital Day: 5 days      Assessment completed within view of staff    History  CC/clinical focus: SI, HI, concern for psychosis/confusion     Interval HPI: Patient seen and evaluated by me.  Chart reviewed.  Patient continues with Zoloft. Tolerating ok but she states it may be making her more anxious. She continues to endorse leg pain. She remains mildly disorganized but seems stable. She denies any SI/HI/AVH. Denies any unmet needs at this time.   Med Compliant.  ROS otherwise as below.    Review of Systems   Constitutional: Negative.    HENT: Negative.     Eyes: Negative.    Respiratory: Negative.     Cardiovascular: Negative.    Gastrointestinal: Negative.    Endocrine: Negative.    Genitourinary: Negative.    Musculoskeletal:  Positive for myalgias.   Skin: Negative.    Allergic/Immunologic: Negative.    Neurological: Negative.    Hematological: Negative.    Psychiatric/Behavioral:  Positive for confusion. The patient is nervous/anxious.         /75 (BP Location: Left arm, Patient Position: Standing)   Pulse 74   Temp 98.1 °F (36.7 °C) (Temporal)   Resp 16   Ht 167.6 cm (65.98\")   Wt 54.1 kg (119 lb 4.8 oz)   LMP  (LMP Unknown)   SpO2 98%   BMI 19.26 kg/m²     Mental Status Exam  Mood: anxious  Affect: mood-congruent   Thought Processes: mildly disorganized  Thought Content: normal  Hallucinations: no  Suicidal Thoughts: denies  Suicidal Plan/Intent: denies  Hopelesness:Moderate  Homicidal Thoughts:  denies      Medical Decision Making:   Labs:     Lab Results (last 24 hours)       Procedure Component Value Units Date/Time    RPR Qualitative with Reflex to Quant [[id number]]  (Normal) Collected: 06/07/25 0253    " Specimen: Blood Updated: 06/07/25 1615     RPR Non-Reactive    Vitamin D,25-Hydroxy [[id number]]  (Abnormal) Collected: 06/07/25 0253    Specimen: Blood Updated: 06/07/25 1421     25 Hydroxy, Vitamin D 9.6 ng/ml     Narrative:      Reference Range for Total Vitamin D 25(OH)     Deficiency <20.0 ng/mL   Insufficiency 21-29 ng/mL   Sufficiency  ng/mL  Toxicity >100 ng/ml      Vitamin B12 [[id number]]  (Normal) Collected: 06/07/25 0253    Specimen: Blood Updated: 06/07/25 1421     Vitamin B-12 237 pg/mL     Narrative:      Results may be falsely increased if patient taking Biotin.      Folate [[id number]]  (Normal) Collected: 06/07/25 0253    Specimen: Blood Updated: 06/07/25 1421     Folate 6.62 ng/mL     Narrative:      Results may be falsely increased if patient taking Biotin.                Radiology:     Imaging Results (Last 24 Hours)       ** No results found for the last 24 hours. **              EKG:     ECG/EMG Results (most recent)       None             Medications:  sertraline, 25 mg, Oral, Daily           All medications reviewed.      Assessment and Plan:      Suicidal ideations  - Admitted for safety and stabilization   - SP 3     Homicidal ideations  - Patient denies today     Other recurrent depressive disorders  - Started sertraline 25 mg po daily on 6/5/2025  - Stopped escitalopram 5 mg daily  - We will establish outpatient psychiatric care following hospitalization     Anxiety disorder unspecified  - Sertraline as above  - Stopped buspirone     Confusion  - SLUMS score of 16 raising concern for an underlying dementia   - Lab workup unrevealing including HIV neg, RPR non-reactive, TSH, folate and B12 within normal limits    Vitamin D deficiency - severe  - Start vitamin D 5000 units daily  - Arrange outpatient follow up     Continue hospitalization for safety and stabilization.  Continue current level of Special Precautions (q15 minute checks).        This note was generated by Stalin ludwig  Rylee. The work documented in this note was completed, reviewed, and approved by the attending psychiatrist as designated Dr. Gab Cloud electronic signature.     I, Gab Cloud MD, personally performed the services described in this documentation as scribed by the above named individual and is both accurate and complete as of 6/8/2025.

## 2025-06-08 NOTE — NURSING NOTE
"Pt came up to nurses station complaining of anxiety. Pt states, \"I usually take Buspar for anxiety but I'm trying to get off of it.\" Dr. Patel notified. New order ntd for Vistaril 10mg po Q6HR PRN.  "

## 2025-06-08 NOTE — PLAN OF CARE
Goal Outcome Evaluation:  Plan of Care Reviewed With: patient  Plan of Care Reviewed With: patient  Patient Agreement with Plan of Care: agrees     Progress: improving  Outcome Evaluation: Pt has been anxious but cooperative this shift. Pt rates anxiety 6/10 and depression 7/10. Pt denies SI/HI/AVH. Pt reports good sleep and good appetite. Pt continues to isolate to her room majority of shift. Pt has had no complaints.

## 2025-06-08 NOTE — NURSING NOTE
Patient slept approximately 8 hours   Subjective:      Sandie Phelps is a 4 y.o. female here with mother. Patient brought in for Urticaria    History of Present Illness:  HPI  Sandie Phelps is a 4 y.o. female. Last night, started with hives on her face. Had hives under her right eye, then spread to her cheek and eye. No medication given. Did not seem to bother her. No medication given. Otherwise well. No swelling lips. No coughing, SOB.  Dad said she had a tomato on her hamburger but she spit it out because she didn't like it. Mom says the hives popped up about 3 minutes after she ate the tomato. This was her first time having tomatoes. Has had red sauce and ketchup. No reactions with these. No other new foods yesterday. No other allergies as far as we know.     Review of Systems   Constitutional: Negative for activity change, appetite change and fever.   HENT: Negative for congestion, ear pain, rhinorrhea, sore throat and trouble swallowing.    Respiratory: Negative for cough.    Gastrointestinal: Negative for diarrhea, nausea and vomiting.   Genitourinary: Negative for decreased urine volume.   Skin: Positive for rash.     Objective:     Physical Exam  Vitals signs and nursing note reviewed.   Constitutional:       General: She is active.      Appearance: She is well-developed.   HENT:      Right Ear: Tympanic membrane normal.      Left Ear: Tympanic membrane normal.      Nose: Nose normal.      Mouth/Throat:      Mouth: Mucous membranes are moist.      Pharynx: Oropharynx is clear.   Eyes:      Conjunctiva/sclera: Conjunctivae normal.   Neck:      Musculoskeletal: Normal range of motion and neck supple.   Cardiovascular:      Rate and Rhythm: Normal rate and regular rhythm.   Pulmonary:      Effort: Pulmonary effort is normal.      Breath sounds: Normal breath sounds.   Abdominal:      Palpations: Abdomen is soft.   Lymphadenopathy:      Cervical: No cervical adenopathy.   Skin:     General: Skin is warm and dry.      Findings: No rash.    Neurological:      Mental Status: She is alert.       Assessment:        1. Urticaria due to food allergy         Plan:       Sandie was seen today for urticaria.    Diagnoses and all orders for this visit:    Urticaria due to food allergy  -     ALLERGEN TOMATO; Future    - Disc possible cause of allergy - possibly raw tomatoes (since tolerates cooked/process tomatoes) or more of a contact irritant.  - Will start with allergy testing to tomatoes. Avoid until results are in. Will refer to allergy as needed.  - keep benadryl on hand.  - Follow up as needed.

## 2025-06-08 NOTE — PLAN OF CARE
Goal Outcome Evaluation:  Plan of Care Reviewed With: patient  Plan of Care Reviewed With: patient  Patient Agreement with Plan of Care: agrees     Progress: improving  Outcome Evaluation: Pt reports anxiety 6/10 and depression 8/10. Pt denies SI/HI/AVH. Pt reports good sleep and appetite.

## 2025-06-09 PROCEDURE — 97162 PT EVAL MOD COMPLEX 30 MIN: CPT

## 2025-06-09 PROCEDURE — 99232 SBSQ HOSP IP/OBS MODERATE 35: CPT | Performed by: PSYCHIATRY & NEUROLOGY

## 2025-06-09 PROCEDURE — 63710000001 ONDANSETRON ODT 4 MG TABLET DISPERSIBLE: Performed by: PSYCHIATRY & NEUROLOGY

## 2025-06-09 RX ADMIN — Medication 2.5 MG: at 21:07

## 2025-06-09 RX ADMIN — Medication 5000 UNITS: at 08:27

## 2025-06-09 RX ADMIN — SERTRALINE HYDROCHLORIDE 25 MG: 50 TABLET ORAL at 08:27

## 2025-06-09 RX ADMIN — ONDANSETRON 4 MG: 4 TABLET, ORALLY DISINTEGRATING ORAL at 08:29

## 2025-06-09 NOTE — PLAN OF CARE
Goal Outcome Evaluation:  Plan of Care Reviewed With: patient  Plan of Care Reviewed With: patient  Patient Agreement with Plan of Care: agrees     Progress: improving  Outcome Evaluation: Patient was cooperative during assessment and appeared to engage with peers more than she had previously. Anxiety 8 and depression 10. Denied SI/HI/AVH. Patient did reports concerns regarding constipation. PRN medications were offered but refused, stating that she will likely take some with her morning med pass. No acutr s/s of distress noted.

## 2025-06-09 NOTE — PROGRESS NOTES
"INPATIENT PSYCHIATRIC PROGRESS NOTE    Name:  Tigist Bates  :  1960  MRN:  [id number]  Visit Number:  [id number]  Length of stay:  6    SUBJECTIVE    CC/Focus of Exam: depression and anxiety    INTERVAL HISTORY:  The patient reports she just doesn't feel good today. She states she is scared of what will happen when she goes home. She states she is not happy to be there because she feels she doesn't have any autonomy, has been there five years and is trying to move. She agreed to be discharged tomorrow and go home and start working on a plan and then move out.   Depression rating 8/10  Anxiety rating 8/10  Sleep:good  Withdrawal sx: Not today  Cravin/10    Review of Systems   Respiratory: Negative.     Neurological:  Positive for weakness.   Psychiatric/Behavioral:  Positive for dysphoric mood. The patient is nervous/anxious.        OBJECTIVE    Temp:  [96.4 °F (35.8 °C)-97 °F (36.1 °C)] 97 °F (36.1 °C)  Heart Rate:  [71-80] 78  Resp:  [16-18] 16  BP: (122-138)/(68-74) 122/68    MENTAL STATUS EXAM:  Appearance:Casually dressed, good hygeine.   Cooperation:Cooperative  Psychomotor: No psychomotor agitation/retardation, No EPS, No motor tics  Speech-normal rate, amount.  Mood \"depressed\"   Affect-congruent, appropriate, stable  Thought Content-goal directed, no delusional material present  Thought process-linear, organized.  Suicidality: No SI  Homicidality: No HI  Perception: No AH/VH  Insight-fair   Judgement-fair    Lab Results (last 24 hours)       ** No results found for the last 24 hours. **               Imaging Results (Last 24 Hours)       ** No results found for the last 24 hours. **               ECG/EMG Results (most recent)       None             ALLERGIES: Patient has no known allergies.    Medication Review:   Scheduled Medications:  sertraline, 25 mg, Oral, Daily  vitamin D3, 5,000 Units, Oral, Daily         PRN Medications:    acetaminophen    aluminum-magnesium hydroxide-simethicone    " benzonatate    bisacodyl    cetirizine    hydrOXYzine    ibuprofen    loperamide    magnesium hydroxide    melatonin    ondansetron ODT    sodium chloride   All medications reviewed.    ASSESSMENT & PLAN:      Suicidal ideations  -SP 3       Homicidal ideations  -Patient denies today       Other recurrent depressive disorders  -Started sertraline 25 mg po daily on 6/5/25.   -Stopped escitalopram 5 mg daily       Anxiety disorder unspecified  -Sertraline as above  -Stopped buspirone    Plan discharge tomorrow.    Special precautions: Special Precautions Level 3 (q15 min checks) .    Behavioral Health Treatment Plan and Problem List: I have reviewed and approved the Behavioral Health Treatment Plan and Problem list.  The patient has had a chance to review and agrees with the treatment plan.    Copied text in portions of this note has been reviewed and is accurate as of 06/09/25         Clinician:  Saroj Law MD  06/09/25  13:30 EDT

## 2025-06-09 NOTE — PLAN OF CARE
Goal Outcome Evaluation:  Plan of Care Reviewed With: patient  Plan of Care Reviewed With: patient  Patient Agreement with Plan of Care: agrees     Progress: no change  Outcome Evaluation: Pt reports good sleep and good appetite. Pt rates Anx 10/10 and Dep 5/10. Pt denies SI/HI/AVH. Pt reports that she is still very anxious and does not feel like she is ready to return home.

## 2025-06-09 NOTE — THERAPY DISCHARGE NOTE
Acute Care - Physical Therapy Initial Evaluation/Discharge   [facility]     Patient Name: Tigist Bates  : 1960  MRN: [id number]  Today's Date: 2025   Onset of Illness/Injury or Date of Surgery: 25     Referring Physician: Pedro Luis      Admit Date: 6/3/2025    Visit Dx:  No diagnosis found.  Patient Active Problem List   Diagnosis    Other recurrent depressive disorders    Depression with suicidal ideation    Essential hypertension    Unstable angina    Palpitations    Suicidal ideations    Anxiety disorder, unspecified     Past Medical History:   Diagnosis Date    Anxiety     Chronic pain     Depression     GERD (gastroesophageal reflux disease)     Suicidal thoughts      Past Surgical History:   Procedure Laterality Date    TONSILLECTOMY         PT Assessment (Last 12 Hours)       PT Evaluation and Treatment       Row Name 25 1507          Physical Therapy Time and Intention    Subjective Information no complaints  -CT     Document Type evaluation;discharge evaluation/summary  -CT     Mode of Treatment physical therapy  -CT     Patient Effort good  -CT     Comment Pt is ambulating around unit independently.  -CT       Row Name 25 1507          General Information    Patient Profile Reviewed yes  -CT     Onset of Illness/Injury or Date of Surgery 25  -CT     Referring Physician Pedro Luis  -CT     Patient Observations alert;cooperative;agree to therapy  -CT     Prior Level of Function independent:;all household mobility;community mobility  -CT     Existing Precautions/Restrictions no known precautions/restrictions  -CT     Risks Reviewed patient:;LOB;nausea/vomiting;dizziness;increased discomfort;change in vital signs;increased drainage;lines disloged  -CT     Benefits Reviewed patient:;improve function;increase independence;increase strength;increase balance;decrease pain;decrease risk of DVT;improve skin integrity;increase knowledge  -CT     Barriers to Rehab none identified  -CT        Row Name 06/09/25 1507          Living Environment    Current Living Arrangements home  -CT     Primary Care Provided by self  -CT       Row Name 06/09/25 1507          Home Use of Assistive/Adaptive Equipment    Equipment Currently Used at Home none  -CT       Row Name 06/09/25 1507          Pain    Additional Documentation Pain Scale: FACES Pre/Post-Treatment (Group)  -CT       Row Name 06/09/25 1507          Pain Scale: FACES Pre/Post-Treatment    Pain: FACES Scale, Pretreatment 0-->no hurt  -CT     Posttreatment Pain Rating 0-->no hurt  -CT       Row Name 06/09/25 1507          Cognition    Affect/Mental Status (Cognition) WNL  -CT     Orientation Status (Cognition) oriented x 4  -CT     Follows Commands (Cognition) WNL  -CT     Personal Safety Interventions fall prevention program maintained;muscle strengthening facilitated;nonskid shoes/slippers when out of bed;supervised activity  -CT       Row Name 06/09/25 1507          Range of Motion (ROM)    Range of Motion ROM is WFL  -CT       Row Name 06/09/25 1507          Strength (Manual Muscle Testing)    Strength (Manual Muscle Testing) strength is WFL  -CT       Row Name 06/09/25 1507          Bed Mobility    Bed Mobility bed mobility (all) activities  -CT     All Activities, Cross Anchor (Bed Mobility) independent  -CT       Row Name 06/09/25 1507          Transfers    Transfers sit-stand transfer;stand-sit transfer  -CT       Row Name 06/09/25 1507          Sit-Stand Transfer    Sit-Stand Cross Anchor (Transfers) independent  -CT       Row Name 06/09/25 1507          Stand-Sit Transfer    Stand-Sit Cross Anchor (Transfers) independent  -CT       Row Name 06/09/25 1507          Gait/Stairs (Locomotion)    Gait/Stairs Locomotion gait/ambulation independence  -CT     Cross Anchor Level (Gait) independent  -CT     Patient was able to Ambulate yes  -CT     Distance in Feet (Gait) 40  -CT     Pattern (Gait) swing-through  -CT       Row Name 06/09/25 1507           Balance    Balance Assessment sitting static balance;sitting dynamic balance;standing static balance;standing dynamic balance  -CT     Static Sitting Balance independent  -CT     Dynamic Sitting Balance independent  -CT     Position, Sitting Balance sitting edge of bed  -CT     Static Standing Balance independent  -CT     Dynamic Standing Balance independent  -CT       Row Name 06/09/25 1507          Coping    Verbalized Emotional State acceptance  -CT       Row Name 06/09/25 1507          Plan of Care Review    Plan of Care Reviewed With patient  -CT       Row Name 06/09/25 1507          Therapy Assessment/Plan (PT)    Criteria for Skilled Interventions Met (PT) no;no problems identified which require skilled intervention  -CT     Therapy Frequency (PT) evaluation only  -CT       Row Name 06/09/25 1507          Therapy Plan Review/Discharge Plan (PT)    Therapy Plan Review (PT) evaluation/treatment results reviewed  -CT               User Key  (r) = Recorded By, (t) = Taken By, (c) = Cosigned By      Initials Name Provider Type    CT Maria Teresa Mendoza, PT Physical Therapist                          PT Recommendation and Plan  Anticipated Discharge Disposition (PT): home  Therapy Frequency (PT): evaluation only  Plan of Care Reviewed With: patient         Time Calculation:    PT Charges       Row Name 06/09/25 1513             Time Calculation    PT Received On 06/09/25  -CT                User Key  (r) = Recorded By, (t) = Taken By, (c) = Cosigned By      Initials Name Provider Type    CT Maria Teresa Mendoza PT Physical Therapist                  Therapy Charges for Today       Code Description Service Date Service Provider Modifiers Qty    [id number]  PT EVAL MOD COMPLEXITY 4 6/9/2025 Maria Teresa Mendoza, PT GP 1            PT G-Codes  AM-PAC 6 Clicks Score (PT): 24    PT Discharge Summary  Anticipated Discharge Disposition (PT): home    Maria Teresa Mendoza PT  6/9/2025

## 2025-06-09 NOTE — PLAN OF CARE
Goal Outcome Evaluation:           Progress: improving  Outcome Evaluation: Patient and therapist met to review care plan, social history, aftercare recommendation, and disposition plan; patient agreeable.           Problem: Adult Behavioral Health Plan of Care  Goal: Patient-Specific Goal (Individualization)  Outcome: Progressing  Flowsheets  Taken 6/4/2025 1542 by Nik Adkins  Patient/Family-Specific Goals (Include Timeframe): Identify 2-3 coping skills, complete safety plan, complete aftercare plan, and deny SI/HI within 1-7 days.  Individualized Care Needs: Therapist to offer 1-4 therapy sessions, aftercare planning, safety planning, daily groups, and brief CBT/MI interventions.  Taken 6/4/2025 1530 by Nik Adkins  Patient Personal Strengths:   resilient   resourceful   self-reliant   motivated for treatment   expressive of needs   expressive of emotions  Patient Vulnerabilities:   lacks insight into illness   poor impulse control   limited support system  Taken 6/3/2025 1518 by Deidre George RN  Anxieties, Fears or Concerns: None verbalized  Goal: Optimized Coping Skills in Response to Life Stressors  Outcome: Progressing  Intervention: Promote Effective Coping Strategies  Flowsheets (Taken 6/9/2025 1054)  Supportive Measures:   counseling provided   decision-making supported   positive reinforcement provided   self-reflection promoted   mindfulness techniques promoted   self-responsibility promoted   verbalization of feelings encouraged   active listening utilized  Goal: Develops/Participates in Therapeutic Camino to Support Successful Transition  Outcome: Progressing  Intervention: Foster Therapeutic Camino  Flowsheets (Taken 6/9/2025 1054)  Trust Relationship/Rapport:   care explained   choices provided   reassurance provided   thoughts/feelings acknowledged   emotional support provided   empathic listening provided   questions answered   questions encouraged  Intervention: Mutually Develop  Transition Plan  Flowsheets  Taken 6/6/2025 1358 by Betsy Mendoza LCSW  Transition Support:   community resources reviewed   crisis management plan promoted   follow-up care discussed   follow-up care coordinated   crisis management plan verbalized  Taken 6/6/2025 1357 by Betsy Mendoza LCSW  Discharge Coordination/Progress: Patient has [employer] insurance, reports no issues with transportation and is scheduled with [facility]  Patient's Choice of Community Agency(s): [facility]  Taken 6/6/2025 1354 by Betsy Mendoza LCSW  Transportation Anticipated: car, drives self  Transportation Concerns: none  Current Discharge Risk:   lives alone   psychiatric illness  Concerns to be Addressed:   medication   mental health  Readmission Within the Last 30 Days: no previous admission in last 30 days  Patient/Family Anticipated Services at Transition:   mental health services   outpatient care  Patient/Family Anticipates Transition to: home  Offered/Gave Vendor List: no  Taken 6/4/2025 1542 by Nik Adkins  Outpatient/Agency/Support Group Needs:   outpatient counseling   outpatient medication management  Anticipated Discharge Disposition: home or self-care     DATA:Therapist met with Patient individually this date. Patient agreeable to discuss current treatment progress and discharge concerns.     Therapist placed call to [facility] apartments where patient lives to confirm she can return there as she expressed some uncertainty. Staff confirms this will not be an issue.     CLINICAL MANUVERING/INTERVENTIONS:  Assisted Patient in processing session content; acknowledged and normalized Patient’s thoughts, feelings, and concerns by utilizing a person-centered approach in efforts to build appropriate rapport and a positive therapeutic relationship with open and honest communication. Allowed Patient to ventilate regarding current stressors and triggers for negative emotions and thoughts in a safe nonjudgmental  environment with unconditional positive regard, active listening skills, and empathy.     ASSESSMENT: Patient seen 1:1 for follow up. Patient reports that she is feeling tired today. Patient states that she feels her current medication is making her drowsy and worries she took too much medicine yesterday. Patient reports that she thinks taking less melatonin would be better for her so she does not feel so drowsy. Patient reports her depression and anxiety at moderate levels. Patient denies SI/HI/AVH.     PLAN: Patient will continue stabilization. Patient will continue to receive services offered by Treatment Team.     Patient will follow-up with [facility].

## 2025-06-10 VITALS
OXYGEN SATURATION: 98 % | DIASTOLIC BLOOD PRESSURE: 74 MMHG | BODY MASS INDEX: 19.17 KG/M2 | HEIGHT: 66 IN | RESPIRATION RATE: 18 BRPM | TEMPERATURE: 98.4 F | WEIGHT: 119.3 LBS | HEART RATE: 77 BPM | SYSTOLIC BLOOD PRESSURE: 122 MMHG

## 2025-06-10 PROCEDURE — 99239 HOSP IP/OBS DSCHRG MGMT >30: CPT | Performed by: PSYCHIATRY & NEUROLOGY

## 2025-06-10 RX ORDER — SERTRALINE HYDROCHLORIDE 25 MG/1
25 TABLET, FILM COATED ORAL DAILY
Qty: 30 TABLET | Refills: 0 | Status: SHIPPED | OUTPATIENT
Start: 2025-06-11

## 2025-06-10 RX ORDER — HYDROXYZINE HYDROCHLORIDE 10 MG/1
10 TABLET, FILM COATED ORAL EVERY 8 HOURS PRN
Qty: 90 TABLET | Refills: 0 | Status: SHIPPED | OUTPATIENT
Start: 2025-06-10

## 2025-06-10 RX ADMIN — Medication 5000 UNITS: at 08:05

## 2025-06-10 RX ADMIN — SERTRALINE HYDROCHLORIDE 25 MG: 50 TABLET ORAL at 08:05

## 2025-06-10 NOTE — PLAN OF CARE
Goal Outcome Evaluation:  Plan of Care Reviewed With: patient  Plan of Care Reviewed With: patient  Patient Agreement with Plan of Care: agrees     Progress: improving  Outcome Evaluation: Patient has been anxious wanting to go home today but cooperative. Patient reports good sleep and good appetite. Pt rates anxiety and depression both 5/10. Pt denies SI/HI/AVH. Pt has had no complaints this shift.

## 2025-06-10 NOTE — PLAN OF CARE
Goal Outcome Evaluation:  Plan of Care Reviewed With: patient  Plan of Care Reviewed With: patient  Patient Agreement with Plan of Care: agrees     Progress: no change  Outcome Evaluation: Patient somewhat anxious this shift but cooperative. Reports good appetite and good sleep. Rates anxiety and depression each at 10. Denies SI/HI/AVH. No complaints this shift. Has slept approximately 8 hours this shift.

## 2025-06-10 NOTE — PLAN OF CARE
Goal Outcome Evaluation:  Plan of Care Reviewed With: patient  Plan of Care Reviewed With: patient  Patient Agreement with Plan of Care: agrees       Pt discharging today.

## 2025-06-10 NOTE — PROGRESS NOTES
Therapist and patient met to discuss patient's upcoming discharge. Patient reports that she is feeling better since her arrival here and thinks that she is better prepared to handle the stressors in her life. Patient reports that she is not thrilled to return to her apartment, but will work with case management services in her outpatient appointments to find a better alternative. Patient denies SI/HI/AVH.     SBSP completed with patient.     Patient to follow up with [facility] in [place].     Patient to provide her own transportation home.

## 2025-06-10 NOTE — DISCHARGE SUMMARY
":  1960  MRN:  [id number]  Visit Number:  [id number]      Date of Admission:6/3/2025   Date of Discharge:  6/10/2025    Discharge Diagnosis:  Principal Problem:    Suicidal ideations  Active Problems:    Other recurrent depressive disorders    Anxiety disorder, unspecified        Admission Diagnosis:  Suicidal ideations [R45.851]     WHITNEY Bates is a 64 y.o. female who was admitted on 6/3/2025 with complaints of having suicidal and homicidal ideations but she wouldn't describe any details and she states it was mostly out of anger and aggravation. She also wanted to stop taking buspirone as it made her feel like a \"zombie\".  For details please see H&P dated 25.     Hospital Course  Patient is a 64 y.o. female presented with depression, anxiety and suicidal ideations along with some thoughts of harming others but no one in particular. The patient wanted to stop taking buspirone as it was making her feel worse. She was started on sertraline and she was able to take it without any adverse effects. She also was given hydroxyzine 10 mg as needed for anxiety. The patient was also able to take part in individual and group counseling sessions and work on appropriate coping skills.  The patient reported she was not happy with her living arrangements and agreed to find a new place after she left the hospital.   The day of discharge the patient was calm, cooperative and pleasant. She denied SI/HI/AVH. Also reported no medication side effects.        Mental Status Exam upon discharge:   Mood \"anxious\"   Affect-congruent, appropriate, stable  Thought Content-goal directed, no delusional material present  Thought process-linear, organized.  Suicidality: No SI  Homicidality: No HI  Perception: No AH/VH    Procedures Performed         Consults:   Consults       No orders found from 2025 to 2025.            Pertinent Test Results:   Admission on 2025   Component Date Value Ref Range Status    " Hepatitis B Surface Ag 06/03/2025 Non-Reactive  Non-Reactive Final    Hep A IgM 06/03/2025 Non-Reactive  Non-Reactive Final    Hep B C IgM 06/03/2025 Non-Reactive  Non-Reactive Final    Hepatitis C Ab 06/03/2025 Non-Reactive  Non-Reactive Final    Total Cholesterol 06/04/2025 172  0 - 200 mg/dL Final    Triglycerides 06/04/2025 65  0 - 150 mg/dL Final    HDL Cholesterol 06/04/2025 62 (H)  40 - 60 mg/dL Final    LDL Cholesterol  06/04/2025 97  0 - 100 mg/dL Final    VLDL Cholesterol 06/04/2025 13  5 - 40 mg/dL Final    LDL/HDL Ratio 06/04/2025 1.56   Final    Hemoglobin A1C 06/04/2025 5.29  4.80 - 5.60 % Final    TSH 06/04/2025 4.030  0.270 - 4.200 uIU/mL Final    25 Hydroxy, Vitamin D 06/07/2025 9.6 (L)  30.0 - 100.0 ng/ml Final    Vitamin B-12 06/07/2025 237  211 - 946 pg/mL Final    Folate 06/07/2025 6.62  4.78 - 24.20 ng/mL Final    RPR 06/07/2025 Non-Reactive  Non-Reactive Final    HIV DUO 06/07/2025 Non-Reactive  Non-Reactive Final   Admission on 06/03/2025, Discharged on 06/03/2025   Component Date Value Ref Range Status    Glucose 06/03/2025 84  65 - 99 mg/dL Final    BUN 06/03/2025 16.0  8.0 - 23.0 mg/dL Final    Creatinine 06/03/2025 0.77  0.57 - 1.00 mg/dL Final    Sodium 06/03/2025 140  136 - 145 mmol/L Final    Potassium 06/03/2025 4.2  3.5 - 5.2 mmol/L Final    Chloride 06/03/2025 105  98 - 107 mmol/L Final    CO2 06/03/2025 24.8  22.0 - 29.0 mmol/L Final    Calcium 06/03/2025 9.2  8.6 - 10.5 mg/dL Final    Total Protein 06/03/2025 7.2  6.0 - 8.5 g/dL Final    Albumin 06/03/2025 4.3  3.5 - 5.2 g/dL Final    ALT (SGPT) 06/03/2025 8  1 - 33 U/L Final    AST (SGOT) 06/03/2025 19  1 - 32 U/L Final    Alkaline Phosphatase 06/03/2025 70  39 - 117 U/L Final    Total Bilirubin 06/03/2025 0.8  0.0 - 1.2 mg/dL Final    Globulin 06/03/2025 2.9  gm/dL Final    A/G Ratio 06/03/2025 1.5  g/dL Final    BUN/Creatinine Ratio 06/03/2025 20.8  7.0 - 25.0 Final    Anion Gap 06/03/2025 10.2  5.0 - 15.0 mmol/L Final    eGFR  06/03/2025 86.8  >60.0 mL/min/1.73 Final    Color, UA 06/03/2025 Yellow  Yellow, Straw Final    Appearance, UA 06/03/2025 Clear  Clear Final    pH, UA 06/03/2025 7.5  5.0 - 8.0 Final    Specific Gravity, UA 06/03/2025 1.008  1.005 - 1.030 Final    Glucose, UA 06/03/2025 Negative  Negative Final    Ketones, UA 06/03/2025 Negative  Negative Final    Bilirubin, UA 06/03/2025 Negative  Negative Final    Blood, UA 06/03/2025 Negative  Negative Final    Protein, UA 06/03/2025 Negative  Negative Final    Leuk Esterase, UA 06/03/2025 Small (1+) (A)  Negative Final    Nitrite, UA 06/03/2025 Negative  Negative Final    Urobilinogen, UA 06/03/2025 1.0 E.U./dL  0.2 - 1.0 E.U./dL Final    Ethanol 06/03/2025 <10  0 - 10 mg/dL Final    Ethanol % 06/03/2025 <0.010  % Final    THC, Screen, Urine 06/03/2025 Negative  Negative Final    Phencyclidine (PCP), Urine 06/03/2025 Negative  Negative Final    Cocaine Screen, Urine 06/03/2025 Negative  Negative Final    Methamphetamine, Ur 06/03/2025 Negative  Negative Final    Opiate Screen 06/03/2025 Negative  Negative Final    Amphetamine Screen, Urine 06/03/2025 Negative  Negative Final    Benzodiazepine Screen, Urine 06/03/2025 Negative  Negative Final    Tricyclic Antidepressants Screen 06/03/2025 Negative  Negative Final    Methadone Screen, Urine 06/03/2025 Negative  Negative Final    Barbiturates Screen, Urine 06/03/2025 Negative  Negative Final    Oxycodone Screen, Urine 06/03/2025 Negative  Negative Final    Buprenorphine, Screen, Urine 06/03/2025 Negative  Negative Final    Magnesium 06/03/2025 2.1  1.6 - 2.4 mg/dL Final    WBC 06/03/2025 4.32  3.40 - 10.80 10*3/mm3 Final    RBC 06/03/2025 4.18  3.77 - 5.28 10*6/mm3 Final    Hemoglobin 06/03/2025 12.3  12.0 - 15.9 g/dL Final    Hematocrit 06/03/2025 39.0  34.0 - 46.6 % Final    MCV 06/03/2025 93.3  79.0 - 97.0 fL Final    MCH 06/03/2025 29.4  26.6 - 33.0 pg Final    MCHC 06/03/2025 31.5  31.5 - 35.7 g/dL Final    RDW 06/03/2025 13.2   12.3 - 15.4 % Final    RDW-SD 06/03/2025 45.2  37.0 - 54.0 fl Final    MPV 06/03/2025 9.2  6.0 - 12.0 fL Final    Platelets 06/03/2025 171  140 - 450 10*3/mm3 Final    Neutrophil % 06/03/2025 48.6  42.7 - 76.0 % Final    Lymphocyte % 06/03/2025 38.0  19.6 - 45.3 % Final    Monocyte % 06/03/2025 8.6  5.0 - 12.0 % Final    Eosinophil % 06/03/2025 3.9  0.3 - 6.2 % Final    Basophil % 06/03/2025 0.7  0.0 - 1.5 % Final    Immature Grans % 06/03/2025 0.2  0.0 - 0.5 % Final    Neutrophils, Absolute 06/03/2025 2.10  1.70 - 7.00 10*3/mm3 Final    Lymphocytes, Absolute 06/03/2025 1.64  0.70 - 3.10 10*3/mm3 Final    Monocytes, Absolute 06/03/2025 0.37  0.10 - 0.90 10*3/mm3 Final    Eosinophils, Absolute 06/03/2025 0.17  0.00 - 0.40 10*3/mm3 Final    Basophils, Absolute 06/03/2025 0.03  0.00 - 0.20 10*3/mm3 Final    Immature Grans, Absolute 06/03/2025 0.01  0.00 - 0.05 10*3/mm3 Final    nRBC 06/03/2025 0.0  0.0 - 0.2 /100 WBC Final    Extra Tube 06/03/2025 Hold for add-ons.   Final    Auto resulted.    Extra Tube 06/03/2025 hold for add-on   Final    Auto resulted    Extra Tube 06/03/2025 Hold for add-ons.   Final    Auto resulted.    Extra Tube 06/03/2025 Hold for add-ons.   Final    Auto resulted    COVID19 06/03/2025 Not Detected  Not Detected - Ref. Range Final    Influenza A PCR 06/03/2025 Not Detected  Not Detected Final    Influenza B PCR 06/03/2025 Not Detected  Not Detected Final    Fentanyl, Urine 06/03/2025 Negative  Negative Final    RBC, UA 06/03/2025 0-2  None Seen, 0-2 /HPF Final    WBC, UA 06/03/2025 0-2  None Seen, 0-2 /HPF Final    Bacteria, UA 06/03/2025 None Seen  None Seen /HPF Final    Squamous Epithelial Cells, UA 06/03/2025 0-2  None Seen, 0-2 /HPF Final    Hyaline Casts, UA 06/03/2025 None Seen  None Seen /LPF Final    Methodology 06/03/2025 Automated Microscopy   Final    QT Interval 06/03/2025 418  ms Final    QTC Interval 06/03/2025 424  ms Final        Condition on Discharge:  improved    Vital  Signs  Temp:  [97.1 °F (36.2 °C)-98.4 °F (36.9 °C)] 98.4 °F (36.9 °C)  Heart Rate:  [77-79] 77  Resp:  [18] 18  BP: (118-126)/(62-74) 122/74      Discharge Disposition:  Home or Self Care    Discharge Medications:     Discharge Medications        New Medications        Instructions Start Date   hydrOXYzine 10 MG tablet  Commonly known as: ATARAX   10 mg, Oral, Every 8 Hours PRN      sertraline 25 MG tablet  Commonly known as: ZOLOFT   25 mg, Oral, Daily   Start Date: June 11, 2025     vitamin D3 125 MCG (5000 UT) capsule capsule   5,000 Units, Oral, Daily   Start Date: June 11, 2025            Continue These Medications        Instructions Start Date   cetirizine 5 MG tablet  Commonly known as: zyrTEC   5 mg, Daily PRN             Stop These Medications      busPIRone 5 MG tablet  Commonly known as: BUSPAR              Discharge Diet: Regular     Activity at Discharge: As tolerated     Follow-up Appointments    [facility] [place] [address] [place] [address]  [phone]      Time: I spent  > 30  minutes on this discharge activity which included: face-to-face encounter with the patient, reviewing the data in the system, coordination of the care with the nursing staff as well as consultants, documentation, and entering orders.        Clinician:   Saroj Law MD  06/10/25  15:28 EDT

## 2025-06-11 NOTE — PROGRESS NOTES
Behavioral Health Discharge Summary             Please fax within 24 hours of discharge to [facility] at: [phone]      Member Name: Tigist Bates Member ID: [id number]   Authorization Number: [id number] Phone: [phone]   Member Address: Leanne Cullen [address] [place] [address]    Discharge Date: 06/10/2025 Level of Care at Discharge:    Facility: [facility] Staff Completing Form: Enedina NGUYEN   If the member is being discharged directly to a residential or extended care program, please specify the type below.   __Private Child-Caring Facility (PCC) Residential/Group Home   __Private Child-Caring Facility (PCC) Therapeutic Foster Care   __Residential Treatment Facility (RTF)   __Psychiatric Residential Treatment Facility (PRTF I or II)   __Long-Term Acute Inpatient Hospital Services or Extended Care Unit (ECU)   __Other (please specify):    Brief discharge summary of treatment received (for follow up by the case management team): D/C clinical with list of medications and follow up appts given to patient upon discharge.     BRIEF SUMMARY OF RECOMMENDATIONS FOR ONGOING TREATMENT     Discharged to where: Home    Discharge diagnoses: F 33.8   Axis I:    Axis II:    Axis III:    Axis IV:    Axis V:    Does the member understand his/her DX?  Yes          Medication     Dose     Schedule Supply/  Quantity  Given at Discharge RX Provided  Yes/No  If Rx Provided, Quantity RX Prior Auth Required  Yes/No Prior Auth  Completed   atarax 10 mg  1 tab by mouth every 8 hours as needed for anxiety         Zoloft  25 mg  1 tab by mouth daily                                                                               Does the member understand the reason for taking these medications? Yes                                                           FOLLOW-UP APPOINTMENTS   Please schedule within 7 days of discharge and provide appointment details for all referred services.     PCP/Other Providers Involved in Treatment:    Appointment Type: OP   Provider Name:   [facility]   Provider Phone:   [phone] Appointment Date: 06/11/2025 Appointment Time:  to see her      Assessment   (new to OP services)        Case Management    Is the member already enrolled in case management?  Yes/No  If yes, date the CM was notified:    If no, was the CM referral offered?  Yes/No  Accepted? Yes/No    Is the Release of Information in the chart? Yes/No:      Medication Management (for member discharged with psychiatric medications):      A&D Treatment (for member with substance abuse/   dependence in the past year):      Medical Condition (for member with a medical condition):    Other recommended treatment:    Do you have any concerns about the discharge plan?  No    If yes, explain:    Was the member involved in the discharge planning?  Yes    If no, explain:    Was a copy of the discharge plan provided to the member?  Yes    If no, explain: